# Patient Record
Sex: MALE | Race: BLACK OR AFRICAN AMERICAN | NOT HISPANIC OR LATINO | Employment: OTHER | URBAN - METROPOLITAN AREA
[De-identification: names, ages, dates, MRNs, and addresses within clinical notes are randomized per-mention and may not be internally consistent; named-entity substitution may affect disease eponyms.]

---

## 2017-09-07 ENCOUNTER — HOSPITAL ENCOUNTER (INPATIENT)
Facility: MEDICAL CENTER | Age: 67
LOS: 8 days | DRG: 871 | End: 2017-09-15
Attending: EMERGENCY MEDICINE | Admitting: INTERNAL MEDICINE
Payer: MEDICARE

## 2017-09-07 ENCOUNTER — RESOLUTE PROFESSIONAL BILLING HOSPITAL PROF FEE (OUTPATIENT)
Dept: HOSPITALIST | Facility: MEDICAL CENTER | Age: 67
End: 2017-09-07
Payer: MEDICARE

## 2017-09-07 ENCOUNTER — APPOINTMENT (OUTPATIENT)
Dept: RADIOLOGY | Facility: MEDICAL CENTER | Age: 67
DRG: 871 | End: 2017-09-07
Attending: EMERGENCY MEDICINE
Payer: MEDICARE

## 2017-09-07 DIAGNOSIS — J18.9 PNEUMONIA OF RIGHT LOWER LOBE DUE TO INFECTIOUS ORGANISM: ICD-10-CM

## 2017-09-07 DIAGNOSIS — A41.9 SEPSIS, DUE TO UNSPECIFIED ORGANISM: ICD-10-CM

## 2017-09-07 PROBLEM — E87.6 HYPOKALEMIA: Status: ACTIVE | Noted: 2017-09-07

## 2017-09-07 PROBLEM — K76.9 LIVER DISEASE: Status: ACTIVE | Noted: 2017-09-07

## 2017-09-07 PROBLEM — R74.01 TRANSAMINITIS: Status: ACTIVE | Noted: 2017-09-07

## 2017-09-07 PROBLEM — R50.9 FEVER: Status: ACTIVE | Noted: 2017-09-07

## 2017-09-07 PROBLEM — D72.829 LEUKOCYTOSIS: Status: ACTIVE | Noted: 2017-09-07

## 2017-09-07 PROBLEM — E87.20 LACTIC ACIDOSIS: Status: ACTIVE | Noted: 2017-09-07

## 2017-09-07 PROBLEM — F17.200 SMOKING: Status: ACTIVE | Noted: 2017-09-07

## 2017-09-07 LAB
ALBUMIN SERPL BCP-MCNC: 3.7 G/DL (ref 3.2–4.9)
ALBUMIN/GLOB SERPL: 1.1 G/DL
ALP SERPL-CCNC: 65 U/L (ref 30–99)
ALT SERPL-CCNC: 27 U/L (ref 2–50)
ANION GAP SERPL CALC-SCNC: 12 MMOL/L (ref 0–11.9)
APPEARANCE UR: CLEAR
AST SERPL-CCNC: 53 U/L (ref 12–45)
BASOPHILS # BLD AUTO: 0.4 % (ref 0–1.8)
BASOPHILS # BLD: 0.07 K/UL (ref 0–0.12)
BILIRUB SERPL-MCNC: 0.8 MG/DL (ref 0.1–1.5)
BILIRUB UR QL STRIP.AUTO: NEGATIVE
BUN SERPL-MCNC: 6 MG/DL (ref 8–22)
CALCIUM SERPL-MCNC: 8.7 MG/DL (ref 8.5–10.5)
CHLORIDE SERPL-SCNC: 109 MMOL/L (ref 96–112)
CO2 SERPL-SCNC: 18 MMOL/L (ref 20–33)
COLOR UR: YELLOW
CREAT SERPL-MCNC: 0.76 MG/DL (ref 0.5–1.4)
EOSINOPHIL # BLD AUTO: 0.01 K/UL (ref 0–0.51)
EOSINOPHIL NFR BLD: 0.1 % (ref 0–6.9)
ERYTHROCYTE [DISTWIDTH] IN BLOOD BY AUTOMATED COUNT: 43.8 FL (ref 35.9–50)
GFR SERPL CREATININE-BSD FRML MDRD: >60 ML/MIN/1.73 M 2
GLOBULIN SER CALC-MCNC: 3.3 G/DL (ref 1.9–3.5)
GLUCOSE SERPL-MCNC: 98 MG/DL (ref 65–99)
GLUCOSE UR STRIP.AUTO-MCNC: NEGATIVE MG/DL
HCT VFR BLD AUTO: 43.2 % (ref 42–52)
HGB BLD-MCNC: 14.4 G/DL (ref 14–18)
IMM GRANULOCYTES # BLD AUTO: 0.15 K/UL (ref 0–0.11)
IMM GRANULOCYTES NFR BLD AUTO: 0.8 % (ref 0–0.9)
KETONES UR STRIP.AUTO-MCNC: ABNORMAL MG/DL
LACTATE BLD-SCNC: 2.4 MMOL/L (ref 0.5–2)
LACTATE BLD-SCNC: 3.3 MMOL/L (ref 0.5–2)
LACTATE BLD-SCNC: 3.4 MMOL/L (ref 0.5–2)
LEUKOCYTE ESTERASE UR QL STRIP.AUTO: NEGATIVE
LYMPHOCYTES # BLD AUTO: 0.84 K/UL (ref 1–4.8)
LYMPHOCYTES NFR BLD: 4.6 % (ref 22–41)
MAGNESIUM SERPL-MCNC: 1.3 MG/DL (ref 1.5–2.5)
MCH RBC QN AUTO: 30.5 PG (ref 27–33)
MCHC RBC AUTO-ENTMCNC: 33.3 G/DL (ref 33.7–35.3)
MCV RBC AUTO: 91.5 FL (ref 81.4–97.8)
MICRO URNS: ABNORMAL
MONOCYTES # BLD AUTO: 1.67 K/UL (ref 0–0.85)
MONOCYTES NFR BLD AUTO: 9 % (ref 0–13.4)
NEUTROPHILS # BLD AUTO: 15.72 K/UL (ref 1.82–7.42)
NEUTROPHILS NFR BLD: 85.1 % (ref 44–72)
NITRITE UR QL STRIP.AUTO: NEGATIVE
NRBC # BLD AUTO: 0 K/UL
NRBC BLD AUTO-RTO: 0 /100 WBC
PH UR STRIP.AUTO: 6.5 [PH]
PLATELET # BLD AUTO: 139 K/UL (ref 164–446)
PMV BLD AUTO: 11.9 FL (ref 9–12.9)
POTASSIUM SERPL-SCNC: 3.4 MMOL/L (ref 3.6–5.5)
PROCALCITONIN SERPL-MCNC: 4.04 NG/ML
PROT SERPL-MCNC: 7 G/DL (ref 6–8.2)
PROT UR QL STRIP: NEGATIVE MG/DL
RBC # BLD AUTO: 4.72 M/UL (ref 4.7–6.1)
RBC UR QL AUTO: NEGATIVE
SODIUM SERPL-SCNC: 139 MMOL/L (ref 135–145)
SP GR UR STRIP.AUTO: 1.02
UROBILINOGEN UR STRIP.AUTO-MCNC: 2 MG/DL
WBC # BLD AUTO: 18.5 K/UL (ref 4.8–10.8)

## 2017-09-07 PROCEDURE — 99223 1ST HOSP IP/OBS HIGH 75: CPT | Mod: AI,25 | Performed by: INTERNAL MEDICINE

## 2017-09-07 PROCEDURE — 81003 URINALYSIS AUTO W/O SCOPE: CPT

## 2017-09-07 PROCEDURE — 96365 THER/PROPH/DIAG IV INF INIT: CPT

## 2017-09-07 PROCEDURE — 36415 COLL VENOUS BLD VENIPUNCTURE: CPT

## 2017-09-07 PROCEDURE — 71010 DX-CHEST-PORTABLE (1 VIEW): CPT

## 2017-09-07 PROCEDURE — A9270 NON-COVERED ITEM OR SERVICE: HCPCS | Performed by: HOSPITALIST

## 2017-09-07 PROCEDURE — 96367 TX/PROPH/DG ADDL SEQ IV INF: CPT

## 2017-09-07 PROCEDURE — 99285 EMERGENCY DEPT VISIT HI MDM: CPT

## 2017-09-07 PROCEDURE — 85025 COMPLETE CBC W/AUTO DIFF WBC: CPT

## 2017-09-07 PROCEDURE — A9270 NON-COVERED ITEM OR SERVICE: HCPCS | Performed by: EMERGENCY MEDICINE

## 2017-09-07 PROCEDURE — 87040 BLOOD CULTURE FOR BACTERIA: CPT | Mod: 91

## 2017-09-07 PROCEDURE — 94760 N-INVAS EAR/PLS OXIMETRY 1: CPT

## 2017-09-07 PROCEDURE — 99407 BEHAV CHNG SMOKING > 10 MIN: CPT | Performed by: INTERNAL MEDICINE

## 2017-09-07 PROCEDURE — 87086 URINE CULTURE/COLONY COUNT: CPT

## 2017-09-07 PROCEDURE — 700102 HCHG RX REV CODE 250 W/ 637 OVERRIDE(OP): Performed by: EMERGENCY MEDICINE

## 2017-09-07 PROCEDURE — 700102 HCHG RX REV CODE 250 W/ 637 OVERRIDE(OP): Performed by: HOSPITALIST

## 2017-09-07 PROCEDURE — 700111 HCHG RX REV CODE 636 W/ 250 OVERRIDE (IP): Performed by: INTERNAL MEDICINE

## 2017-09-07 PROCEDURE — 83735 ASSAY OF MAGNESIUM: CPT

## 2017-09-07 PROCEDURE — 700105 HCHG RX REV CODE 258: Performed by: INTERNAL MEDICINE

## 2017-09-07 PROCEDURE — 700105 HCHG RX REV CODE 258: Performed by: EMERGENCY MEDICINE

## 2017-09-07 PROCEDURE — 700102 HCHG RX REV CODE 250 W/ 637 OVERRIDE(OP): Performed by: INTERNAL MEDICINE

## 2017-09-07 PROCEDURE — 84145 PROCALCITONIN (PCT): CPT

## 2017-09-07 PROCEDURE — 700111 HCHG RX REV CODE 636 W/ 250 OVERRIDE (IP): Performed by: EMERGENCY MEDICINE

## 2017-09-07 PROCEDURE — 83605 ASSAY OF LACTIC ACID: CPT

## 2017-09-07 PROCEDURE — 770020 HCHG ROOM/CARE - TELE (206)

## 2017-09-07 PROCEDURE — 80053 COMPREHEN METABOLIC PANEL: CPT

## 2017-09-07 PROCEDURE — A9270 NON-COVERED ITEM OR SERVICE: HCPCS | Performed by: INTERNAL MEDICINE

## 2017-09-07 RX ORDER — ONDANSETRON 4 MG/1
4 TABLET, ORALLY DISINTEGRATING ORAL EVERY 4 HOURS PRN
Status: DISCONTINUED | OUTPATIENT
Start: 2017-09-07 | End: 2017-09-15 | Stop reason: HOSPADM

## 2017-09-07 RX ORDER — BISACODYL 10 MG
10 SUPPOSITORY, RECTAL RECTAL
Status: DISCONTINUED | OUTPATIENT
Start: 2017-09-07 | End: 2017-09-15 | Stop reason: HOSPADM

## 2017-09-07 RX ORDER — FOLIC ACID 1 MG/1
1 TABLET ORAL DAILY
Status: COMPLETED | OUTPATIENT
Start: 2017-09-07 | End: 2017-09-10

## 2017-09-07 RX ORDER — LORAZEPAM 0.5 MG/1
0.5 TABLET ORAL EVERY 4 HOURS PRN
Status: DISCONTINUED | OUTPATIENT
Start: 2017-09-07 | End: 2017-09-15 | Stop reason: HOSPADM

## 2017-09-07 RX ORDER — ACETAMINOPHEN 325 MG/1
650 TABLET ORAL ONCE
Status: COMPLETED | OUTPATIENT
Start: 2017-09-07 | End: 2017-09-07

## 2017-09-07 RX ORDER — LORAZEPAM 2 MG/ML
2 INJECTION INTRAMUSCULAR
Status: DISCONTINUED | OUTPATIENT
Start: 2017-09-07 | End: 2017-09-15 | Stop reason: HOSPADM

## 2017-09-07 RX ORDER — AMOXICILLIN 250 MG
2 CAPSULE ORAL 2 TIMES DAILY
Status: DISCONTINUED | OUTPATIENT
Start: 2017-09-07 | End: 2017-09-15 | Stop reason: HOSPADM

## 2017-09-07 RX ORDER — OXYCODONE HYDROCHLORIDE 5 MG/1
2.5 TABLET ORAL
Status: DISCONTINUED | OUTPATIENT
Start: 2017-09-07 | End: 2017-09-15 | Stop reason: HOSPADM

## 2017-09-07 RX ORDER — NICOTINE 21 MG/24HR
14 PATCH, TRANSDERMAL 24 HOURS TRANSDERMAL
Status: DISCONTINUED | OUTPATIENT
Start: 2017-09-07 | End: 2017-09-15 | Stop reason: HOSPADM

## 2017-09-07 RX ORDER — LORAZEPAM 1 MG/1
2 TABLET ORAL
Status: DISCONTINUED | OUTPATIENT
Start: 2017-09-07 | End: 2017-09-15 | Stop reason: HOSPADM

## 2017-09-07 RX ORDER — MORPHINE SULFATE 4 MG/ML
2 INJECTION, SOLUTION INTRAMUSCULAR; INTRAVENOUS
Status: DISCONTINUED | OUTPATIENT
Start: 2017-09-07 | End: 2017-09-15 | Stop reason: HOSPADM

## 2017-09-07 RX ORDER — SODIUM CHLORIDE 9 MG/ML
500 INJECTION, SOLUTION INTRAVENOUS
Status: DISCONTINUED | OUTPATIENT
Start: 2017-09-07 | End: 2017-09-13

## 2017-09-07 RX ORDER — LORAZEPAM 2 MG/ML
1.5 INJECTION INTRAMUSCULAR
Status: DISCONTINUED | OUTPATIENT
Start: 2017-09-07 | End: 2017-09-15 | Stop reason: HOSPADM

## 2017-09-07 RX ORDER — LORAZEPAM 2 MG/ML
0.5 INJECTION INTRAMUSCULAR EVERY 4 HOURS PRN
Status: DISCONTINUED | OUTPATIENT
Start: 2017-09-07 | End: 2017-09-15 | Stop reason: HOSPADM

## 2017-09-07 RX ORDER — LORAZEPAM 1 MG/1
3 TABLET ORAL
Status: DISCONTINUED | OUTPATIENT
Start: 2017-09-07 | End: 2017-09-15 | Stop reason: HOSPADM

## 2017-09-07 RX ORDER — POLYETHYLENE GLYCOL 3350 17 G/17G
1 POWDER, FOR SOLUTION ORAL
Status: DISCONTINUED | OUTPATIENT
Start: 2017-09-07 | End: 2017-09-15 | Stop reason: HOSPADM

## 2017-09-07 RX ORDER — ACETAMINOPHEN 325 MG/1
650 TABLET ORAL EVERY 6 HOURS PRN
Status: DISCONTINUED | OUTPATIENT
Start: 2017-09-07 | End: 2017-09-15 | Stop reason: HOSPADM

## 2017-09-07 RX ORDER — OXYCODONE HYDROCHLORIDE 5 MG/1
5 TABLET ORAL
Status: DISCONTINUED | OUTPATIENT
Start: 2017-09-07 | End: 2017-09-15 | Stop reason: HOSPADM

## 2017-09-07 RX ORDER — THIAMINE MONONITRATE (VIT B1) 100 MG
100 TABLET ORAL DAILY
Status: COMPLETED | OUTPATIENT
Start: 2017-09-07 | End: 2017-09-10

## 2017-09-07 RX ORDER — AMPICILLIN AND SULBACTAM 2; 1 G/1; G/1
3 INJECTION, POWDER, FOR SOLUTION INTRAMUSCULAR; INTRAVENOUS ONCE
Status: COMPLETED | OUTPATIENT
Start: 2017-09-07 | End: 2017-09-07

## 2017-09-07 RX ORDER — SODIUM CHLORIDE 9 MG/ML
30 INJECTION, SOLUTION INTRAVENOUS
Status: COMPLETED | OUTPATIENT
Start: 2017-09-07 | End: 2017-09-07

## 2017-09-07 RX ORDER — LORAZEPAM 2 MG/ML
1 INJECTION INTRAMUSCULAR
Status: DISCONTINUED | OUTPATIENT
Start: 2017-09-07 | End: 2017-09-15 | Stop reason: HOSPADM

## 2017-09-07 RX ORDER — LORAZEPAM 1 MG/1
4 TABLET ORAL
Status: DISCONTINUED | OUTPATIENT
Start: 2017-09-07 | End: 2017-09-15 | Stop reason: HOSPADM

## 2017-09-07 RX ORDER — LORAZEPAM 1 MG/1
1 TABLET ORAL EVERY 4 HOURS PRN
Status: DISCONTINUED | OUTPATIENT
Start: 2017-09-07 | End: 2017-09-15 | Stop reason: HOSPADM

## 2017-09-07 RX ORDER — AZITHROMYCIN 250 MG/1
250 TABLET, FILM COATED ORAL DAILY
Status: COMPLETED | OUTPATIENT
Start: 2017-09-08 | End: 2017-09-11

## 2017-09-07 RX ORDER — SODIUM CHLORIDE 9 MG/ML
30 INJECTION, SOLUTION INTRAVENOUS
Status: DISCONTINUED | OUTPATIENT
Start: 2017-09-07 | End: 2017-09-07

## 2017-09-07 RX ORDER — AZITHROMYCIN 500 MG/1
500 INJECTION, POWDER, LYOPHILIZED, FOR SOLUTION INTRAVENOUS ONCE
Status: COMPLETED | OUTPATIENT
Start: 2017-09-07 | End: 2017-09-07

## 2017-09-07 RX ORDER — ONDANSETRON 2 MG/ML
4 INJECTION INTRAMUSCULAR; INTRAVENOUS EVERY 4 HOURS PRN
Status: DISCONTINUED | OUTPATIENT
Start: 2017-09-07 | End: 2017-09-15 | Stop reason: HOSPADM

## 2017-09-07 RX ORDER — SODIUM CHLORIDE 9 MG/ML
INJECTION, SOLUTION INTRAVENOUS CONTINUOUS
Status: DISCONTINUED | OUTPATIENT
Start: 2017-09-07 | End: 2017-09-15 | Stop reason: HOSPADM

## 2017-09-07 RX ORDER — POTASSIUM CHLORIDE 20 MEQ/1
40 TABLET, EXTENDED RELEASE ORAL ONCE
Status: COMPLETED | OUTPATIENT
Start: 2017-09-07 | End: 2017-09-07

## 2017-09-07 RX ADMIN — POTASSIUM CHLORIDE 40 MEQ: 1500 TABLET, EXTENDED RELEASE ORAL at 07:00

## 2017-09-07 RX ADMIN — SODIUM CHLORIDE: 9 INJECTION, SOLUTION INTRAVENOUS at 08:47

## 2017-09-07 RX ADMIN — SODIUM CHLORIDE 2313 ML: 9 INJECTION, SOLUTION INTRAVENOUS at 06:07

## 2017-09-07 RX ADMIN — THERA TABS 1 TABLET: TAB at 12:47

## 2017-09-07 RX ADMIN — AMPICILLIN SODIUM AND SULBACTAM SODIUM 3 G: 2; 1 INJECTION, POWDER, FOR SOLUTION INTRAMUSCULAR; INTRAVENOUS at 06:08

## 2017-09-07 RX ADMIN — AMPICILLIN SODIUM AND SULBACTAM SODIUM 3 G: 2; 1 INJECTION, POWDER, FOR SOLUTION INTRAMUSCULAR; INTRAVENOUS at 20:11

## 2017-09-07 RX ADMIN — AMPICILLIN SODIUM AND SULBACTAM SODIUM 3 G: 2; 1 INJECTION, POWDER, FOR SOLUTION INTRAMUSCULAR; INTRAVENOUS at 12:47

## 2017-09-07 RX ADMIN — NICOTINE 14 MG: 14 PATCH, EXTENDED RELEASE TRANSDERMAL at 08:46

## 2017-09-07 RX ADMIN — FOLIC ACID 1 MG: 1 TABLET ORAL at 12:47

## 2017-09-07 RX ADMIN — ACETAMINOPHEN 650 MG: 325 TABLET, FILM COATED ORAL at 06:08

## 2017-09-07 RX ADMIN — SODIUM CHLORIDE: 9 INJECTION, SOLUTION INTRAVENOUS at 20:45

## 2017-09-07 RX ADMIN — LORAZEPAM 1 MG: 1 TABLET ORAL at 15:40

## 2017-09-07 RX ADMIN — Medication 100 MG: at 12:47

## 2017-09-07 RX ADMIN — AZITHROMYCIN MONOHYDRATE 500 MG: 500 INJECTION, POWDER, LYOPHILIZED, FOR SOLUTION INTRAVENOUS at 06:38

## 2017-09-07 ASSESSMENT — LIFESTYLE VARIABLES
TREMOR: *
DOES PATIENT WANT TO TALK TO SOMEONE ABOUT QUITTING: YES
TOTAL SCORE: 3
TOTAL SCORE: 2
EVER FELT BAD OR GUILTY ABOUT YOUR DRINKING: YES
PAROXYSMAL SWEATS: NO SWEAT VISIBLE
CONSUMPTION TOTAL: POSITIVE
ORIENTATION AND CLOUDING OF SENSORIUM: ORIENTED AND CAN DO SERIAL ADDITIONS
VISUAL DISTURBANCES: NOT PRESENT
AUDITORY DISTURBANCES: NOT PRESENT
ORIENTATION AND CLOUDING OF SENSORIUM: ORIENTED AND CAN DO SERIAL ADDITIONS
AUDITORY DISTURBANCES: NOT PRESENT
TOTAL SCORE: 4
ALCOHOL_USE: YES
VISUAL DISTURBANCES: NOT PRESENT
TOTAL SCORE: 4
AGITATION: SOMEWHAT MORE THAN NORMAL ACTIVITY
AUDITORY DISTURBANCES: NOT PRESENT
TREMOR: TREMOR NOT VISIBLE BUT CAN BE FELT, FINGERTIP TO FINGERTIP
HEADACHE, FULLNESS IN HEAD: NOT PRESENT
PAROXYSMAL SWEATS: BARELY PERCEPTIBLE SWEATING. PALMS MOIST
AGITATION: NORMAL ACTIVITY
HAVE PEOPLE ANNOYED YOU BY CRITICIZING YOUR DRINKING: YES
NAUSEA AND VOMITING: NO NAUSEA AND NO VOMITING
ANXIETY: NO ANXIETY (AT EASE)
AGITATION: SOMEWHAT MORE THAN NORMAL ACTIVITY
NAUSEA AND VOMITING: NO NAUSEA AND NO VOMITING
NAUSEA AND VOMITING: NO NAUSEA AND NO VOMITING
VISUAL DISTURBANCES: NOT PRESENT
HAVE YOU EVER FELT YOU SHOULD CUT DOWN ON YOUR DRINKING: YES
EVER HAD A DRINK FIRST THING IN THE MORNING TO STEADY YOUR NERVES TO GET RID OF A HANGOVER: YES
HEADACHE, FULLNESS IN HEAD: NOT PRESENT
TREMOR: *
EVER_SMOKED: YES
TREMOR: NO TREMOR
ANXIETY: MILDLY ANXIOUS
HEADACHE, FULLNESS IN HEAD: NOT PRESENT
ORIENTATION AND CLOUDING OF SENSORIUM: ORIENTED AND CAN DO SERIAL ADDITIONS
HEADACHE, FULLNESS IN HEAD: VERY MILD
TOTAL SCORE: 7
ON A TYPICAL DAY WHEN YOU DRINK ALCOHOL HOW MANY DRINKS DO YOU HAVE: 14
HOW MANY TIMES IN THE PAST YEAR HAVE YOU HAD 5 OR MORE DRINKS IN A DAY: 180
AUDITORY DISTURBANCES: NOT PRESENT
PAROXYSMAL SWEATS: NO SWEAT VISIBLE
EVER_SMOKED: YES
AVERAGE NUMBER OF DAYS PER WEEK YOU HAVE A DRINK CONTAINING ALCOHOL: 7
ANXIETY: *
ORIENTATION AND CLOUDING OF SENSORIUM: ORIENTED AND CAN DO SERIAL ADDITIONS
ANXIETY: MILDLY ANXIOUS
AGITATION: *
NAUSEA AND VOMITING: MILD NAUSEA WITH NO VOMITING
PAROXYSMAL SWEATS: NO SWEAT VISIBLE
DOES PATIENT WANT TO STOP DRINKING: YES
TOTAL SCORE: 4
VISUAL DISTURBANCES: NOT PRESENT
TOTAL SCORE: 4

## 2017-09-07 ASSESSMENT — PATIENT HEALTH QUESTIONNAIRE - PHQ9
5. POOR APPETITE OR OVEREATING: MORE THAN HALF THE DAYS
6. FEELING BAD ABOUT YOURSELF - OR THAT YOU ARE A FAILURE OR HAVE LET YOURSELF OR YOUR FAMILY DOWN: MORE THAN HALF THE DAYS
8. MOVING OR SPEAKING SO SLOWLY THAT OTHER PEOPLE COULD HAVE NOTICED. OR THE OPPOSITE, BEING SO FIGETY OR RESTLESS THAT YOU HAVE BEEN MOVING AROUND A LOT MORE THAN USUAL: MORE THAN HALF THE DAYS
9. THOUGHTS THAT YOU WOULD BE BETTER OFF DEAD, OR OF HURTING YOURSELF: NOT AT ALL
2. FEELING DOWN, DEPRESSED, IRRITABLE, OR HOPELESS: MORE THAN HALF THE DAYS
SUM OF ALL RESPONSES TO PHQ9 QUESTIONS 1 AND 2: 2
4. FEELING TIRED OR HAVING LITTLE ENERGY: MORE THAN HALF THE DAYS
SUM OF ALL RESPONSES TO PHQ QUESTIONS 1-9: 14
3. TROUBLE FALLING OR STAYING ASLEEP OR SLEEPING TOO MUCH: MORE THAN HALF THE DAYS
7. TROUBLE CONCENTRATING ON THINGS, SUCH AS READING THE NEWSPAPER OR WATCHING TELEVISION: MORE THAN HALF THE DAYS

## 2017-09-07 ASSESSMENT — COPD QUESTIONNAIRES
DURING THE PAST 4 WEEKS HOW MUCH DID YOU FEEL SHORT OF BREATH: NONE/LITTLE OF THE TIME
COPD SCREENING SCORE: 5
DO YOU EVER COUGH UP ANY MUCUS OR PHLEGM?: NO/ONLY WITH OCCASIONAL COLDS OR INFECTIONS
HAVE YOU SMOKED AT LEAST 100 CIGARETTES IN YOUR ENTIRE LIFE: YES

## 2017-09-07 ASSESSMENT — PAIN SCALES - GENERAL
PAINLEVEL_OUTOF10: 0
PAINLEVEL_OUTOF10: 0

## 2017-09-07 NOTE — H&P
HOSPITAL MEDICINE HISTORY/ PHYSICAL    Date & Time note created:    9/7/2017   6:09 AM       Date & Time Patient was seen:   9/7/2017    Patient ID:   Name: Chepe Persaud. YOB: 1950. Age: 66 y.o. male. MRN: [id number]    Admitting Attending:  Priscila Benavidez     PCP : Pcp Pt States None        Chief Complaint:       Fever, cough, fatigue, alcohol intoxication    History of Present Illness:    Cori is a 66 y.o. male w/h/o alcoholic, liver disease, who presents with complaint of fever, cough, fatigue, and alcohol intoxication. Patient said he drink alcohol at home but did not have any. Patient also complained of fever, cough, fatigue for the possibility days. Patient has chronic abdominal pain and taking Percocet. In the ER patient was noticed to have fever, leukocytosis, chest x-ray confirmed patient has pneumonia likely. Patient elected acid is 3.2. Patient was then recommended to be admitted to the hospital for evaluation and treatment. Patient otherwise denies chills, nausea, vomiting, adb pain, SOB, CP, headache, constipation, diarrhea, or sputum.    Review of Systems:      per HPI otherwise 14 points reviewed 12 systems neg per AMA/CMS criteria.        Past Medical/ Family / Social history (PFSH):   Past Medical History:   Diagnosis Date   • Liver disease      Past Surgical History:   Procedure Laterality Date   • PATELLA ORIF  1/17/2009    Performed by COMPA MURILLO at [facility]     Current Outpatient Medications:  No current facility-administered medications on file prior to encounter.      Current Outpatient Prescriptions on File Prior to Encounter   Medication Sig Dispense Refill   • PERCOCET PO Take  by mouth.       Medication Allergy/Sensitivities:  Allergies   Allergen Reactions   • Codeine Hives     Family History:  History reviewed. No pertinent family history.   reviewed and felt non pertinent to this encounter     Social History:  Social History   Substance Use  Topics   • Smoking status: Current Every Day Smoker     Packs/day: 0.50     Types: Cigarettes   • Smokeless tobacco: Never Used      Comment: pk/day   • Alcohol use Yes      Comment: daily, gallon of vodka per day     #################################################################  Physical Exam:   Vitals/ General Appearance:   Weight/BMI: Body mass index is 23.06 kg/m².  Pulse 94, temperature (!) 38.6 °C (101.5 °F), resp. rate 18, height 1.829 m (6'), weight 77.1 kg (170 lb), SpO2 96 %.   Vitals:    09/07/17 0424 09/07/17 0430 09/07/17 0500 09/07/17 0530   Pulse: 97 88 92 94   Resp: (!) 24 (!) 21 18 18   Temp: (!) 38.6 °C (101.5 °F)      SpO2: 93% 94% 95% 96%   Weight:       Height:        Oxygen Therapy:  Pulse Oximetry: 96 %, O2 (LPM): 0, O2 Delivery: None (Room Air)    Constitutional:  thin, frail  HENMT: Normocephalic, atraumatic, b/l ears normal, nose normal  Eyes:  EOMI, conjunctiva normal, no discharge  Neck: no tracheal deviation, supple  Cardiovascular: tachycardic, normal rhythm, no murmurs, no rubs or gallops; no cyanosis, clubbing or edema  Lungs: Respiratory effort is normal, normal breath sounds, breath sounds clear to auscultation b/l, no rales, rhonchi or wheezing  Abdomen: soft, non-tender, no guarding or rebound, active BS, no mass  Skin: warm, dry, no erythema, no rash  Neurologic: Alert and oriented, strength 5/5, no focal deficits, CN II-XII normal  Psychiatric: No anxiety or depression  Lymph node: No lymphadenopathy appreciated in the neck groin and axillary area.   Extremities: Bilateral lower extremities no pitting edema, bilateral pulses symmetric  #################################################################  Lab Data Review:    Objective   Recent Results (from the past 24 hour(s))   LACTIC ACID    Collection Time: 09/07/17  4:47 AM   Result Value Ref Range    Lactic Acid 3.4 (H) 0.5 - 2.0 mmol/L   CBC WITH DIFFERENTIAL    Collection Time: 09/07/17  5:08 AM   Result Value Ref Range     WBC 18.5 (H) 4.8 - 10.8 K/uL    RBC 4.72 4.70 - 6.10 M/uL    Hemoglobin 14.4 14.0 - 18.0 g/dL    Hematocrit 43.2 42.0 - 52.0 %    MCV 91.5 81.4 - 97.8 fL    MCH 30.5 27.0 - 33.0 pg    MCHC 33.3 (L) 33.7 - 35.3 g/dL    RDW 43.8 35.9 - 50.0 fL    Platelet Count 139 (L) 164 - 446 K/uL    MPV 11.9 9.0 - 12.9 fL    Neutrophils-Polys 85.10 (H) 44.00 - 72.00 %    Lymphocytes 4.60 (L) 22.00 - 41.00 %    Monocytes 9.00 0.00 - 13.40 %    Eosinophils 0.10 0.00 - 6.90 %    Basophils 0.40 0.00 - 1.80 %    Immature Granulocytes 0.80 0.00 - 0.90 %    Nucleated RBC 0.00 /100 WBC    Neutrophils (Absolute) 15.72 (H) 1.82 - 7.42 K/uL    Lymphs (Absolute) 0.84 (L) 1.00 - 4.80 K/uL    Monos (Absolute) 1.67 (H) 0.00 - 0.85 K/uL    Eos (Absolute) 0.01 0.00 - 0.51 K/uL    Baso (Absolute) 0.07 0.00 - 0.12 K/uL    Immature Granulocytes (abs) 0.15 (H) 0.00 - 0.11 K/uL    NRBC (Absolute) 0.00 K/uL   COMP METABOLIC PANEL    Collection Time: 09/07/17  5:08 AM   Result Value Ref Range    Sodium 139 135 - 145 mmol/L    Potassium 3.4 (L) 3.6 - 5.5 mmol/L    Chloride 109 96 - 112 mmol/L    Co2 18 (L) 20 - 33 mmol/L    Anion Gap 12.0 (H) 0.0 - 11.9    Glucose 98 65 - 99 mg/dL    Bun 6 (L) 8 - 22 mg/dL    Creatinine 0.76 0.50 - 1.40 mg/dL    Calcium 8.7 8.5 - 10.5 mg/dL    AST(SGOT) 53 (H) 12 - 45 U/L    ALT(SGPT) 27 2 - 50 U/L    Alkaline Phosphatase 65 30 - 99 U/L    Total Bilirubin 0.8 0.1 - 1.5 mg/dL    Albumin 3.7 3.2 - 4.9 g/dL    Total Protein 7.0 6.0 - 8.2 g/dL    Globulin 3.3 1.9 - 3.5 g/dL    A-G Ratio 1.1 g/dL   ESTIMATED GFR    Collection Time: 09/07/17  5:08 AM   Result Value Ref Range    GFR If African American >60 >60 mL/min/1.73 m 2    GFR If Non African American >60 >60 mL/min/1.73 m 2       (click the triangle to expand results)    Imaging/Procedures Review:    DX-CHEST-PORTABLE (1 VIEW)   Final Result         1. Airspace opacities in the right lower lung, likely pneumonia.        EKG:   per my independant read:  HR: 102, Sinus  Rhythm, no ST/T changes    Assessment and Plan:      Smoking- (present on admission)   Assessment & Plan    Smoking secession education provided  nicotine patch        Lactic acidosis- (present on admission)   Assessment & Plan    Lactic 3.4  From sepsis        Transaminitis- (present on admission)   Assessment & Plan    From etoh        Hypokalemia- (present on admission)   Assessment & Plan    K 3.4  Po replace  Check mg        Leukocytosis- (present on admission)   Assessment & Plan    From sepsis and pna  Recheck in am  Culture pending        Fever- (present on admission)   Assessment & Plan    From PNA  Iv abx        Liver disease- (present on admission)   Assessment & Plan    Likely from ETOH  Mild LFT elevation  Close monitor        PNA (pneumonia)- (present on admission)   Assessment & Plan    XR confirmed PNA  Iv unasyn and azithro  Culture pending  Check procalcitonin        Sepsis (CMS-HCC)- (present on admission)   Assessment & Plan    This is sepsis (without associated acute organ dysfunction).   Lactic 3.4  We will put the patient on full sepsis protocol including lactic acid checks  ivf  Iv abx unasyn and azithro  Culture pending          1. Prophylaxis: lovenox  2. Code: Full code per patient   3. Dispo: he will be admitted to inpatient for management that is expected to take more than 2 midnights   I have discussed patient admission status with  in the ER.    I spent 73 minutes evaluating Mr. Persaud, reviewing the chart, vitals, labs and imaging, discussing the case with ED physician, medication reconciliation, placing orders and enacting the plan above.    Spent 11 minutes on tobacco cessation counseling including nicotine patches, gum, and dangers of smoking.    The pt indicated that will quit.     46928 (smoking and tobacco cessation counseling visit; intensive, greater than 10 minutes).    This dictation was created using voice recognition software. The accuracy of the dictation  is limited to the abilities of the software. Although every efforts have been used to decrease the error, I expect there may be some errors of grammar and possibly content.

## 2017-09-07 NOTE — ED PROVIDER NOTES
ED Provider Note    Scribed for Vidal Banerjee M.D. by Kelley Saini. 9/7/2017  4:52 AM    Primary care provider: Pcp Pt States None  Means of arrival: EMS  History obtained from: Patient   History limited by: None    CHIEF COMPLAINT  Chief Complaint   Patient presents with   • Alcohol Intoxication       HPI  Chepe Persaud is a 66 y.o. male who presents to the Emergency Department for alcohol intoxication, just prior to arrival in the ED. Patient states that he generally has not been feeling well. Patient reports he has had a cough, vomiting, and diarrhea. He denies any fever, shortness of breath, chest pain, abdominal pain, joint pain, back pain, or neck pain associated.     REVIEW OF SYSTEMS  Pertinent positives include substance abuse, cough, vomiting, diarrhea.   Pertinent negatives include no  fever, shortness of breath, chest pain, abdominal pain, joint pain, back pain, or neck pain.    All other systems reviewed and negative.    C.    PAST MEDICAL HISTORY   has a past medical history of Liver disease.    SURGICAL HISTORY   has a past surgical history that includes patella orif (1/17/2009).    SOCIAL HISTORY  Social History   Substance Use Topics   • Smoking status: Current Every Day Smoker     Packs/day: 0.50     Types: Cigarettes   • Smokeless tobacco: Never Used      Comment: pk/day   • Alcohol use Yes      Comment: daily, gallon of vodka per day      History   Drug Use No       FAMILY HISTORY  History reviewed. No pertinent family history.    CURRENT MEDICATIONS  Home Medications     Reviewed by Dinesh Bryan R.N. (Registered Nurse) on 09/07/17 at 0424  Med List Status: Not Addressed   Medication Last Dose Status   PERCOCET PO  Active                ALLERGIES  Allergies   Allergen Reactions   • Codeine Hives       PHYSICAL EXAM  VITAL SIGNS: Pulse 92   Temp (!) 38.6 °C (101.5 °F)   Resp 18   Ht 1.829 m (6')   Wt 77.1 kg (170 lb)   SpO2 95%   BMI 23.06 kg/m²     Nursing note and  vitals reviewed.  Constitutional: Well-developed and well-nourished. Mild distress.  chronically ill-appearing.  HENT: Head is normocephalic and atraumatic. Oropharynx is clear and moist without exudate or erythema.   Eyes: Pupils are equal, round, and reactive to light. Conjunctiva are normal.   Cardiovascular: Normal rate and regular rhythm. No murmur heard. Normal radial pulses.  Pulmonary/Chest: Scattered rhonchi. No wheezes or rales.   Abdominal: Soft and non-tender. No distention    Musculoskeletal: Extremities exhibit normal range of motion without edema or tenderness.   Neurological: Awake, alert and oriented to person, place, and time. No focal deficits noted.  Skin: Skin is warm and dry. No rash. Tactile fever.  Psychiatric: Normal mood and affect. Appropriate for clinical situation    DIAGNOSTIC STUDIES / PROCEDURES    EKG Interpretation  See below.     LABS  Results for orders placed or performed during the hospital encounter of 09/07/17   LACTIC ACID   Result Value Ref Range    Lactic Acid 3.4 (H) 0.5 - 2.0 mmol/L   CBC WITH DIFFERENTIAL   Result Value Ref Range    WBC 18.5 (H) 4.8 - 10.8 K/uL    RBC 4.72 4.70 - 6.10 M/uL    Hemoglobin 14.4 14.0 - 18.0 g/dL    Hematocrit 43.2 42.0 - 52.0 %    MCV 91.5 81.4 - 97.8 fL    MCH 30.5 27.0 - 33.0 pg    MCHC 33.3 (L) 33.7 - 35.3 g/dL    RDW 43.8 35.9 - 50.0 fL    Platelet Count 139 (L) 164 - 446 K/uL    MPV 11.9 9.0 - 12.9 fL    Neutrophils-Polys 85.10 (H) 44.00 - 72.00 %    Lymphocytes 4.60 (L) 22.00 - 41.00 %    Monocytes 9.00 0.00 - 13.40 %    Eosinophils 0.10 0.00 - 6.90 %    Basophils 0.40 0.00 - 1.80 %    Immature Granulocytes 0.80 0.00 - 0.90 %    Nucleated RBC 0.00 /100 WBC    Neutrophils (Absolute) 15.72 (H) 1.82 - 7.42 K/uL    Lymphs (Absolute) 0.84 (L) 1.00 - 4.80 K/uL    Monos (Absolute) 1.67 (H) 0.00 - 0.85 K/uL    Eos (Absolute) 0.01 0.00 - 0.51 K/uL    Baso (Absolute) 0.07 0.00 - 0.12 K/uL    Immature Granulocytes (abs) 0.15 (H) 0.00 - 0.11 K/uL     NRBC (Absolute) 0.00 K/uL   COMP METABOLIC PANEL   Result Value Ref Range    Sodium 139 135 - 145 mmol/L    Potassium 3.4 (L) 3.6 - 5.5 mmol/L    Chloride 109 96 - 112 mmol/L    Co2 18 (L) 20 - 33 mmol/L    Anion Gap 12.0 (H) 0.0 - 11.9    Glucose 98 65 - 99 mg/dL    Bun 6 (L) 8 - 22 mg/dL    Creatinine 0.76 0.50 - 1.40 mg/dL    Calcium 8.7 8.5 - 10.5 mg/dL    AST(SGOT) 53 (H) 12 - 45 U/L    ALT(SGPT) 27 2 - 50 U/L    Alkaline Phosphatase 65 30 - 99 U/L    Total Bilirubin 0.8 0.1 - 1.5 mg/dL    Albumin 3.7 3.2 - 4.9 g/dL    Total Protein 7.0 6.0 - 8.2 g/dL    Globulin 3.3 1.9 - 3.5 g/dL    A-G Ratio 1.1 g/dL   ESTIMATED GFR   Result Value Ref Range    GFR If African American >60 >60 mL/min/1.73 m 2    GFR If Non African American >60 >60 mL/min/1.73 m 2       All labs reviewed by me.    RADIOLOGY  DX-CHEST-PORTABLE (1 VIEW)   Final Result         1. Airspace opacities in the right lower lung, likely pneumonia.        The radiologist's interpretation of all radiological studies have been reviewed by me.    COURSE & MEDICAL DECISION MAKING  Nursing notes, VS, PMSFHx reviewed in chart.       4:52 AM - Patient seen and examined at bedside. Ordered lactic acid, estimated GFR, CBC with differential, CMP, Urinalysis, Urine culture, blood culture, DX-chest to evaluate his symptoms. The differential diagnoses include but are not limited to:Pneumonia, urinary tract infection, I like slight abnormality, sepsis    Laboratory studies are remarkable for an elevated white blood cell count. Patient's overall presentation is consistent with sepsis. Chest x-ray is consistent with pneumonia. The patient has been treated with antibiotics appropriate for pneumonia as well as normal saline fluid resuscitation consistent with sepsis recommendations.    5:59 AM Callahan texted Dr. Benavidez about the patient's case.     6:08AM Patient treated with 650mg Tylenol, 3g Unasyn, 400mg Zithromax, and IV fluids for sepsis.     6:28 AM - I discussed the  patient's case and the above findings with Dr. Benavidez (Hospitalist) who agrees to admit the patient.    6:30 AM - Patient treated with 40mEq potassium chloride.    CRITICAL CARE  I provided critical care services, which included medication orders, frequent reevaluations of the patient's condition and response to treatment, ordering and reviewing test results, and discussing the case with various consultants.  The critical care time associated with the care of the patient was 35 minutes. Review chart for interventions. This time is exclusive of any other billable procedures.        DISPOSITION:  Patient will be admitted to Dr. Benavidez (Hospitalist) in critical condition.      FINAL IMPRESSION  1. Sepsis, due to unspecified organism (CMS-HCC)    2. Pneumonia of right lower lobe due to infectious organism          IKelley (Scribe), am scribing for, and in the presence of, Vidal Banerjee M.D..    Electronically signed by: Kelley Saini (Scribe), 9/7/2017    IVidal M.D. personally performed the services described in this documentation, as scribed by Kelley Saini in my presence, and it is both accurate and complete.    The note accurately reflects work and decisions made by me.  Vidal Banerjee  9/7/2017  11:29 AM

## 2017-09-07 NOTE — ED NOTES
Pt BIB EMS. Pt was found outside shivering on the street. Pt was in wet clothes. Pt was incontinent of urine and stool. Pt admits to drink beer and vodka today. Pt denies drug use. Pin point pupils. Pt is A&Ox4 and cooperative at this time.

## 2017-09-07 NOTE — ASSESSMENT & PLAN NOTE
This is sepsis (without associated acute organ dysfunction). - pneumonia source-  Resolved- afeb, nl wbc  Follow clinically   oral atbs.

## 2017-09-07 NOTE — ED NOTES
Med rec complete per Pt at bedside  Pt states he takes Tramadol and Naproxen  But has not had either medication in over a month  Allergies reviewed  No ABX in last 30 days

## 2017-09-07 NOTE — ASSESSMENT & PLAN NOTE
CAP -- Improved, off o2-- completed Azith - Clinically better  oral augmentin- complete 10 day course.

## 2017-09-08 PROBLEM — F10.939 ALCOHOL WITHDRAWAL (HCC): Status: ACTIVE | Noted: 2017-09-08

## 2017-09-08 LAB
ALBUMIN SERPL BCP-MCNC: 2.8 G/DL (ref 3.2–4.9)
ALBUMIN/GLOB SERPL: 1 G/DL
ALP SERPL-CCNC: 50 U/L (ref 30–99)
ALT SERPL-CCNC: 25 U/L (ref 2–50)
ANION GAP SERPL CALC-SCNC: 7 MMOL/L (ref 0–11.9)
AST SERPL-CCNC: 58 U/L (ref 12–45)
BASOPHILS # BLD AUTO: 0.4 % (ref 0–1.8)
BASOPHILS # BLD: 0.07 K/UL (ref 0–0.12)
BILIRUB SERPL-MCNC: 0.8 MG/DL (ref 0.1–1.5)
BUN SERPL-MCNC: 8 MG/DL (ref 8–22)
CALCIUM SERPL-MCNC: 7.9 MG/DL (ref 8.5–10.5)
CHLORIDE SERPL-SCNC: 108 MMOL/L (ref 96–112)
CO2 SERPL-SCNC: 23 MMOL/L (ref 20–33)
CREAT SERPL-MCNC: 0.94 MG/DL (ref 0.5–1.4)
EOSINOPHIL # BLD AUTO: 0.17 K/UL (ref 0–0.51)
EOSINOPHIL NFR BLD: 1 % (ref 0–6.9)
ERYTHROCYTE [DISTWIDTH] IN BLOOD BY AUTOMATED COUNT: 44.7 FL (ref 35.9–50)
GFR SERPL CREATININE-BSD FRML MDRD: >60 ML/MIN/1.73 M 2
GLOBULIN SER CALC-MCNC: 2.8 G/DL (ref 1.9–3.5)
GLUCOSE SERPL-MCNC: 93 MG/DL (ref 65–99)
HCT VFR BLD AUTO: 39.5 % (ref 42–52)
HGB BLD-MCNC: 13 G/DL (ref 14–18)
IMM GRANULOCYTES # BLD AUTO: 0.26 K/UL (ref 0–0.11)
IMM GRANULOCYTES NFR BLD AUTO: 1.6 % (ref 0–0.9)
LACTATE BLD-SCNC: 1.1 MMOL/L (ref 0.5–2)
LYMPHOCYTES # BLD AUTO: 2.68 K/UL (ref 1–4.8)
LYMPHOCYTES NFR BLD: 16.1 % (ref 22–41)
MAGNESIUM SERPL-MCNC: 1.5 MG/DL (ref 1.5–2.5)
MCH RBC QN AUTO: 30.4 PG (ref 27–33)
MCHC RBC AUTO-ENTMCNC: 32.9 G/DL (ref 33.7–35.3)
MCV RBC AUTO: 92.5 FL (ref 81.4–97.8)
MONOCYTES # BLD AUTO: 1.1 K/UL (ref 0–0.85)
MONOCYTES NFR BLD AUTO: 6.6 % (ref 0–13.4)
NEUTROPHILS # BLD AUTO: 12.39 K/UL (ref 1.82–7.42)
NEUTROPHILS NFR BLD: 74.3 % (ref 44–72)
NRBC # BLD AUTO: 0 K/UL
NRBC BLD AUTO-RTO: 0 /100 WBC
PHOSPHATE SERPL-MCNC: 2 MG/DL (ref 2.5–4.5)
PLATELET # BLD AUTO: 122 K/UL (ref 164–446)
PMV BLD AUTO: 12.3 FL (ref 9–12.9)
POTASSIUM SERPL-SCNC: 3.4 MMOL/L (ref 3.6–5.5)
PROT SERPL-MCNC: 5.6 G/DL (ref 6–8.2)
RBC # BLD AUTO: 4.27 M/UL (ref 4.7–6.1)
SODIUM SERPL-SCNC: 138 MMOL/L (ref 135–145)
WBC # BLD AUTO: 16.7 K/UL (ref 4.8–10.8)

## 2017-09-08 PROCEDURE — 85025 COMPLETE CBC W/AUTO DIFF WBC: CPT

## 2017-09-08 PROCEDURE — A9270 NON-COVERED ITEM OR SERVICE: HCPCS | Performed by: INTERNAL MEDICINE

## 2017-09-08 PROCEDURE — 83605 ASSAY OF LACTIC ACID: CPT

## 2017-09-08 PROCEDURE — 700111 HCHG RX REV CODE 636 W/ 250 OVERRIDE (IP): Performed by: INTERNAL MEDICINE

## 2017-09-08 PROCEDURE — 700102 HCHG RX REV CODE 250 W/ 637 OVERRIDE(OP): Performed by: HOSPITALIST

## 2017-09-08 PROCEDURE — A9270 NON-COVERED ITEM OR SERVICE: HCPCS | Performed by: HOSPITALIST

## 2017-09-08 PROCEDURE — 83735 ASSAY OF MAGNESIUM: CPT

## 2017-09-08 PROCEDURE — 99232 SBSQ HOSP IP/OBS MODERATE 35: CPT | Performed by: HOSPITALIST

## 2017-09-08 PROCEDURE — 80053 COMPREHEN METABOLIC PANEL: CPT

## 2017-09-08 PROCEDURE — 36415 COLL VENOUS BLD VENIPUNCTURE: CPT

## 2017-09-08 PROCEDURE — 770020 HCHG ROOM/CARE - TELE (206)

## 2017-09-08 PROCEDURE — 700105 HCHG RX REV CODE 258: Performed by: INTERNAL MEDICINE

## 2017-09-08 PROCEDURE — 700102 HCHG RX REV CODE 250 W/ 637 OVERRIDE(OP): Performed by: INTERNAL MEDICINE

## 2017-09-08 PROCEDURE — 84100 ASSAY OF PHOSPHORUS: CPT

## 2017-09-08 RX ORDER — POTASSIUM CHLORIDE 20 MEQ/1
20 TABLET, EXTENDED RELEASE ORAL DAILY
Status: DISCONTINUED | OUTPATIENT
Start: 2017-09-08 | End: 2017-09-09

## 2017-09-08 RX ADMIN — AMPICILLIN SODIUM AND SULBACTAM SODIUM 3 G: 2; 1 INJECTION, POWDER, FOR SOLUTION INTRAMUSCULAR; INTRAVENOUS at 23:33

## 2017-09-08 RX ADMIN — ENOXAPARIN SODIUM 40 MG: 100 INJECTION SUBCUTANEOUS at 21:48

## 2017-09-08 RX ADMIN — Medication 100 MG: at 09:32

## 2017-09-08 RX ADMIN — SODIUM CHLORIDE: 9 INJECTION, SOLUTION INTRAVENOUS at 22:22

## 2017-09-08 RX ADMIN — POTASSIUM CHLORIDE 20 MEQ: 1500 TABLET, EXTENDED RELEASE ORAL at 18:30

## 2017-09-08 RX ADMIN — THERA TABS 1 TABLET: TAB at 09:32

## 2017-09-08 RX ADMIN — STANDARDIZED SENNA CONCENTRATE AND DOCUSATE SODIUM 2 TABLET: 8.6; 5 TABLET, FILM COATED ORAL at 21:48

## 2017-09-08 RX ADMIN — AMPICILLIN SODIUM AND SULBACTAM SODIUM 3 G: 2; 1 INJECTION, POWDER, FOR SOLUTION INTRAMUSCULAR; INTRAVENOUS at 13:50

## 2017-09-08 RX ADMIN — STANDARDIZED SENNA CONCENTRATE AND DOCUSATE SODIUM 2 TABLET: 8.6; 5 TABLET, FILM COATED ORAL at 09:34

## 2017-09-08 RX ADMIN — LORAZEPAM 1 MG: 1 TABLET ORAL at 09:33

## 2017-09-08 RX ADMIN — AMPICILLIN SODIUM AND SULBACTAM SODIUM 3 G: 2; 1 INJECTION, POWDER, FOR SOLUTION INTRAMUSCULAR; INTRAVENOUS at 05:16

## 2017-09-08 RX ADMIN — AMPICILLIN SODIUM AND SULBACTAM SODIUM 3 G: 2; 1 INJECTION, POWDER, FOR SOLUTION INTRAMUSCULAR; INTRAVENOUS at 00:12

## 2017-09-08 RX ADMIN — AZITHROMYCIN 250 MG: 250 TABLET, FILM COATED ORAL at 09:32

## 2017-09-08 RX ADMIN — NICOTINE 14 MG: 14 PATCH, EXTENDED RELEASE TRANSDERMAL at 05:03

## 2017-09-08 RX ADMIN — AMPICILLIN SODIUM AND SULBACTAM SODIUM 3 G: 2; 1 INJECTION, POWDER, FOR SOLUTION INTRAMUSCULAR; INTRAVENOUS at 18:45

## 2017-09-08 RX ADMIN — FOLIC ACID 1 MG: 1 TABLET ORAL at 09:32

## 2017-09-08 ASSESSMENT — LIFESTYLE VARIABLES
ORIENTATION AND CLOUDING OF SENSORIUM: ORIENTED AND CAN DO SERIAL ADDITIONS
HEADACHE, FULLNESS IN HEAD: VERY MILD
AUDITORY DISTURBANCES: NOT PRESENT
AUDITORY DISTURBANCES: NOT PRESENT
NAUSEA AND VOMITING: MILD NAUSEA WITH NO VOMITING
TOTAL SCORE: 2
ANXIETY: MILDLY ANXIOUS
ORIENTATION AND CLOUDING OF SENSORIUM: ORIENTED AND CAN DO SERIAL ADDITIONS
PAROXYSMAL SWEATS: NO SWEAT VISIBLE
NAUSEA AND VOMITING: NO NAUSEA AND NO VOMITING
TREMOR: TREMOR NOT VISIBLE BUT CAN BE FELT, FINGERTIP TO FINGERTIP
TOTAL SCORE: 7
PAROXYSMAL SWEATS: NO SWEAT VISIBLE
HEADACHE, FULLNESS IN HEAD: NOT PRESENT
ANXIETY: NO ANXIETY (AT EASE)
VISUAL DISTURBANCES: NOT PRESENT
ANXIETY: NO ANXIETY (AT EASE)
TOTAL SCORE: 1
ANXIETY: MILDLY ANXIOUS
AUDITORY DISTURBANCES: NOT PRESENT
HEADACHE, FULLNESS IN HEAD: NOT PRESENT
AGITATION: NORMAL ACTIVITY
VISUAL DISTURBANCES: NOT PRESENT
AUDITORY DISTURBANCES: NOT PRESENT
ORIENTATION AND CLOUDING OF SENSORIUM: ORIENTED AND CAN DO SERIAL ADDITIONS
TOTAL SCORE: 4
ANXIETY: NO ANXIETY (AT EASE)
AGITATION: NORMAL ACTIVITY
PAROXYSMAL SWEATS: NO SWEAT VISIBLE
VISUAL DISTURBANCES: NOT PRESENT
NAUSEA AND VOMITING: NO NAUSEA AND NO VOMITING
ANXIETY: MILDLY ANXIOUS
TREMOR: TREMOR NOT VISIBLE BUT CAN BE FELT, FINGERTIP TO FINGERTIP
PAROXYSMAL SWEATS: NO SWEAT VISIBLE
HEADACHE, FULLNESS IN HEAD: NOT PRESENT
NAUSEA AND VOMITING: NO NAUSEA AND NO VOMITING
TOTAL SCORE: 2
AUDITORY DISTURBANCES: NOT PRESENT
ANXIETY: NO ANXIETY (AT EASE)
HEADACHE, FULLNESS IN HEAD: VERY MILD
HEADACHE, FULLNESS IN HEAD: VERY MILD
TREMOR: TREMOR NOT VISIBLE BUT CAN BE FELT, FINGERTIP TO FINGERTIP
ORIENTATION AND CLOUDING OF SENSORIUM: ORIENTED AND CAN DO SERIAL ADDITIONS
AGITATION: SOMEWHAT MORE THAN NORMAL ACTIVITY
AGITATION: NORMAL ACTIVITY
TREMOR: TREMOR NOT VISIBLE BUT CAN BE FELT, FINGERTIP TO FINGERTIP
TREMOR: NO TREMOR
NAUSEA AND VOMITING: NO NAUSEA AND NO VOMITING
PAROXYSMAL SWEATS: BARELY PERCEPTIBLE SWEATING. PALMS MOIST
TREMOR: TREMOR NOT VISIBLE BUT CAN BE FELT, FINGERTIP TO FINGERTIP
AUDITORY DISTURBANCES: NOT PRESENT
AGITATION: SOMEWHAT MORE THAN NORMAL ACTIVITY
TREMOR: TREMOR NOT VISIBLE BUT CAN BE FELT, FINGERTIP TO FINGERTIP
ORIENTATION AND CLOUDING OF SENSORIUM: ORIENTED AND CAN DO SERIAL ADDITIONS
AGITATION: NORMAL ACTIVITY
AUDITORY DISTURBANCES: NOT PRESENT
PAROXYSMAL SWEATS: NO SWEAT VISIBLE
TOTAL SCORE: 2
PAROXYSMAL SWEATS: BARELY PERCEPTIBLE SWEATING. PALMS MOIST
ORIENTATION AND CLOUDING OF SENSORIUM: ORIENTED AND CAN DO SERIAL ADDITIONS
NAUSEA AND VOMITING: NO NAUSEA AND NO VOMITING
VISUAL DISTURBANCES: NOT PRESENT
VISUAL DISTURBANCES: NOT PRESENT
HEADACHE, FULLNESS IN HEAD: NOT PRESENT
NAUSEA AND VOMITING: NO NAUSEA AND NO VOMITING
VISUAL DISTURBANCES: NOT PRESENT
AGITATION: *
TOTAL SCORE: 1
ORIENTATION AND CLOUDING OF SENSORIUM: ORIENTED AND CAN DO SERIAL ADDITIONS
VISUAL DISTURBANCES: NOT PRESENT

## 2017-09-08 ASSESSMENT — PAIN SCALES - GENERAL
PAINLEVEL_OUTOF10: 0

## 2017-09-08 ASSESSMENT — ENCOUNTER SYMPTOMS
HEARTBURN: 0
PHOTOPHOBIA: 0
FEVER: 0
HEADACHES: 0

## 2017-09-08 NOTE — PROGRESS NOTES
Assumed care of patient.  Patient is calm, resting with eyes closed, and in no acute distress.  CIWA protocol in place.  Bed alarm on.  Bed in lowest position and locked.  Call light in reach at all times.  Will monitor per protocol.

## 2017-09-08 NOTE — CARE PLAN
Problem: Safety  Goal: Will remain free from injury  Outcome: PROGRESSING AS EXPECTED  Call light in reach at all times.  Bed in low/locked position with strip alarm on.  Hourly rounding in place.    Problem: Venous Thromboembolism (VTW)/Deep Vein Thrombosis (DVT) Prevention:  Goal: Patient will participate in Venous Thrombosis (VTE)/Deep Vein Thrombosis (DVT)Prevention Measures  Outcome: PROGRESSING AS EXPECTED  lovenox for anticoagulation

## 2017-09-08 NOTE — CARE PLAN
Problem: Safety  Goal: Will remain free from injury  Outcome: PROGRESSING AS EXPECTED  Patient remained free of falls or injury during shift    Problem: Infection  Goal: Will remain free from infection  Outcome: PROGRESSING AS EXPECTED  Patient remained free of new signs or symptoms of infection during shift.

## 2017-09-09 LAB
BACTERIA UR CULT: NORMAL
BASOPHILS # BLD AUTO: 0.3 % (ref 0–1.8)
BASOPHILS # BLD: 0.03 K/UL (ref 0–0.12)
EKG IMPRESSION: NORMAL
EOSINOPHIL # BLD AUTO: 0.15 K/UL (ref 0–0.51)
EOSINOPHIL NFR BLD: 1.6 % (ref 0–6.9)
ERYTHROCYTE [DISTWIDTH] IN BLOOD BY AUTOMATED COUNT: 44.4 FL (ref 35.9–50)
HCT VFR BLD AUTO: 38.2 % (ref 42–52)
HGB BLD-MCNC: 12.9 G/DL (ref 14–18)
IMM GRANULOCYTES # BLD AUTO: 0.06 K/UL (ref 0–0.11)
IMM GRANULOCYTES NFR BLD AUTO: 0.6 % (ref 0–0.9)
LYMPHOCYTES # BLD AUTO: 1.28 K/UL (ref 1–4.8)
LYMPHOCYTES NFR BLD: 13.3 % (ref 22–41)
MAGNESIUM SERPL-MCNC: 1.6 MG/DL (ref 1.5–2.5)
MCH RBC QN AUTO: 31.5 PG (ref 27–33)
MCHC RBC AUTO-ENTMCNC: 33.8 G/DL (ref 33.7–35.3)
MCV RBC AUTO: 93.2 FL (ref 81.4–97.8)
MONOCYTES # BLD AUTO: 0.55 K/UL (ref 0–0.85)
MONOCYTES NFR BLD AUTO: 5.7 % (ref 0–13.4)
NEUTROPHILS # BLD AUTO: 7.57 K/UL (ref 1.82–7.42)
NEUTROPHILS NFR BLD: 78.5 % (ref 44–72)
NRBC # BLD AUTO: 0 K/UL
NRBC BLD AUTO-RTO: 0 /100 WBC
PHOSPHATE SERPL-MCNC: 2.5 MG/DL (ref 2.5–4.5)
PLATELET # BLD AUTO: 129 K/UL (ref 164–446)
PMV BLD AUTO: 11.7 FL (ref 9–12.9)
POTASSIUM SERPL-SCNC: 3.2 MMOL/L (ref 3.6–5.5)
PROCALCITONIN SERPL-MCNC: 4.37 NG/ML
PROCALCITONIN SERPL-MCNC: 4.96 NG/ML
RBC # BLD AUTO: 4.1 M/UL (ref 4.7–6.1)
SIGNIFICANT IND 70042: NORMAL
SITE SITE: NORMAL
SOURCE SOURCE: NORMAL
TROPONIN I SERPL-MCNC: <0.01 NG/ML (ref 0–0.04)
WBC # BLD AUTO: 9.6 K/UL (ref 4.8–10.8)

## 2017-09-09 PROCEDURE — A9270 NON-COVERED ITEM OR SERVICE: HCPCS | Performed by: INTERNAL MEDICINE

## 2017-09-09 PROCEDURE — 83735 ASSAY OF MAGNESIUM: CPT

## 2017-09-09 PROCEDURE — 85025 COMPLETE CBC W/AUTO DIFF WBC: CPT

## 2017-09-09 PROCEDURE — 700102 HCHG RX REV CODE 250 W/ 637 OVERRIDE(OP): Performed by: HOSPITALIST

## 2017-09-09 PROCEDURE — 36415 COLL VENOUS BLD VENIPUNCTURE: CPT

## 2017-09-09 PROCEDURE — 700102 HCHG RX REV CODE 250 W/ 637 OVERRIDE(OP): Performed by: INTERNAL MEDICINE

## 2017-09-09 PROCEDURE — A9270 NON-COVERED ITEM OR SERVICE: HCPCS | Performed by: HOSPITALIST

## 2017-09-09 PROCEDURE — 700111 HCHG RX REV CODE 636 W/ 250 OVERRIDE (IP): Performed by: INTERNAL MEDICINE

## 2017-09-09 PROCEDURE — 700105 HCHG RX REV CODE 258: Performed by: INTERNAL MEDICINE

## 2017-09-09 PROCEDURE — 93005 ELECTROCARDIOGRAM TRACING: CPT | Performed by: HOSPITALIST

## 2017-09-09 PROCEDURE — 99232 SBSQ HOSP IP/OBS MODERATE 35: CPT | Performed by: HOSPITALIST

## 2017-09-09 PROCEDURE — 84132 ASSAY OF SERUM POTASSIUM: CPT

## 2017-09-09 PROCEDURE — 770020 HCHG ROOM/CARE - TELE (206)

## 2017-09-09 PROCEDURE — 84100 ASSAY OF PHOSPHORUS: CPT

## 2017-09-09 PROCEDURE — 84484 ASSAY OF TROPONIN QUANT: CPT

## 2017-09-09 PROCEDURE — 93010 ELECTROCARDIOGRAM REPORT: CPT | Performed by: INTERNAL MEDICINE

## 2017-09-09 PROCEDURE — 84145 PROCALCITONIN (PCT): CPT

## 2017-09-09 RX ORDER — POTASSIUM CHLORIDE 20 MEQ/1
20 TABLET, EXTENDED RELEASE ORAL 2 TIMES DAILY
Status: DISCONTINUED | OUTPATIENT
Start: 2017-09-09 | End: 2017-09-15 | Stop reason: HOSPADM

## 2017-09-09 RX ORDER — NITROGLYCERIN 0.4 MG/1
0.4 TABLET SUBLINGUAL
Status: DISCONTINUED | OUTPATIENT
Start: 2017-09-09 | End: 2017-09-15 | Stop reason: HOSPADM

## 2017-09-09 RX ADMIN — NITROGLYCERIN 0.4 MG: 0.4 TABLET SUBLINGUAL at 12:40

## 2017-09-09 RX ADMIN — AMPICILLIN SODIUM AND SULBACTAM SODIUM 3 G: 2; 1 INJECTION, POWDER, FOR SOLUTION INTRAMUSCULAR; INTRAVENOUS at 05:27

## 2017-09-09 RX ADMIN — STANDARDIZED SENNA CONCENTRATE AND DOCUSATE SODIUM 2 TABLET: 8.6; 5 TABLET, FILM COATED ORAL at 08:35

## 2017-09-09 RX ADMIN — MORPHINE SULFATE 2 MG: 4 INJECTION INTRAVENOUS at 12:19

## 2017-09-09 RX ADMIN — AZITHROMYCIN 250 MG: 250 TABLET, FILM COATED ORAL at 08:34

## 2017-09-09 RX ADMIN — POTASSIUM CHLORIDE 20 MEQ: 1500 TABLET, EXTENDED RELEASE ORAL at 08:35

## 2017-09-09 RX ADMIN — AMPICILLIN SODIUM AND SULBACTAM SODIUM 3 G: 2; 1 INJECTION, POWDER, FOR SOLUTION INTRAMUSCULAR; INTRAVENOUS at 17:36

## 2017-09-09 RX ADMIN — OXYCODONE HYDROCHLORIDE 5 MG: 5 TABLET ORAL at 19:37

## 2017-09-09 RX ADMIN — LORAZEPAM 0.5 MG: 0.5 TABLET ORAL at 19:37

## 2017-09-09 RX ADMIN — LIDOCAINE HYDROCHLORIDE 15 ML: 20 SOLUTION OROPHARYNGEAL at 11:38

## 2017-09-09 RX ADMIN — SODIUM CHLORIDE: 9 INJECTION, SOLUTION INTRAVENOUS at 16:28

## 2017-09-09 RX ADMIN — AMPICILLIN SODIUM AND SULBACTAM SODIUM 3 G: 2; 1 INJECTION, POWDER, FOR SOLUTION INTRAMUSCULAR; INTRAVENOUS at 13:17

## 2017-09-09 RX ADMIN — THERA TABS 1 TABLET: TAB at 08:35

## 2017-09-09 RX ADMIN — LORAZEPAM 0.5 MG: 0.5 TABLET ORAL at 08:35

## 2017-09-09 RX ADMIN — ENOXAPARIN SODIUM 40 MG: 100 INJECTION SUBCUTANEOUS at 20:33

## 2017-09-09 RX ADMIN — ACETAMINOPHEN 650 MG: 325 TABLET, FILM COATED ORAL at 08:35

## 2017-09-09 RX ADMIN — LORAZEPAM 1 MG: 1 TABLET ORAL at 00:31

## 2017-09-09 RX ADMIN — POTASSIUM CHLORIDE 20 MEQ: 1500 TABLET, EXTENDED RELEASE ORAL at 20:33

## 2017-09-09 RX ADMIN — NICOTINE 14 MG: 14 PATCH, EXTENDED RELEASE TRANSDERMAL at 05:28

## 2017-09-09 RX ADMIN — SODIUM CHLORIDE: 9 INJECTION, SOLUTION INTRAVENOUS at 05:22

## 2017-09-09 RX ADMIN — Medication 100 MG: at 08:35

## 2017-09-09 RX ADMIN — AMPICILLIN SODIUM AND SULBACTAM SODIUM 3 G: 2; 1 INJECTION, POWDER, FOR SOLUTION INTRAMUSCULAR; INTRAVENOUS at 23:37

## 2017-09-09 RX ADMIN — FOLIC ACID 1 MG: 1 TABLET ORAL at 08:35

## 2017-09-09 ASSESSMENT — LIFESTYLE VARIABLES
ORIENTATION AND CLOUDING OF SENSORIUM: ORIENTED AND CAN DO SERIAL ADDITIONS
TREMOR: NO TREMOR
AUDITORY DISTURBANCES: NOT PRESENT
TOTAL SCORE: 6
AGITATION: SOMEWHAT MORE THAN NORMAL ACTIVITY
NAUSEA AND VOMITING: NO NAUSEA AND NO VOMITING
ORIENTATION AND CLOUDING OF SENSORIUM: ORIENTED AND CAN DO SERIAL ADDITIONS
TREMOR: *
ANXIETY: MILDLY ANXIOUS
TOTAL SCORE: VERY MILD ITCHING, PINS AND NEEDLES SENSATION, BURNING OR NUMBNESS
HEADACHE, FULLNESS IN HEAD: MODERATE
ANXIETY: MILDLY ANXIOUS
TOTAL SCORE: VERY MILD ITCHING, PINS AND NEEDLES SENSATION, BURNING OR NUMBNESS
PAROXYSMAL SWEATS: NO SWEAT VISIBLE
TREMOR: *
PAROXYSMAL SWEATS: NO SWEAT VISIBLE
NAUSEA AND VOMITING: NO NAUSEA AND NO VOMITING
NAUSEA AND VOMITING: MILD NAUSEA WITH NO VOMITING
AGITATION: SOMEWHAT MORE THAN NORMAL ACTIVITY
ANXIETY: MILDLY ANXIOUS
TREMOR: NO TREMOR
NAUSEA AND VOMITING: NO NAUSEA AND NO VOMITING
ORIENTATION AND CLOUDING OF SENSORIUM: ORIENTED AND CAN DO SERIAL ADDITIONS
ORIENTATION AND CLOUDING OF SENSORIUM: ORIENTED AND CAN DO SERIAL ADDITIONS
NAUSEA AND VOMITING: NO NAUSEA AND NO VOMITING
HEADACHE, FULLNESS IN HEAD: VERY MILD
PAROXYSMAL SWEATS: NO SWEAT VISIBLE
HEADACHE, FULLNESS IN HEAD: MILD
AGITATION: SOMEWHAT MORE THAN NORMAL ACTIVITY
HEADACHE, FULLNESS IN HEAD: NOT PRESENT
TOTAL SCORE: 3
TOTAL SCORE: 4
VISUAL DISTURBANCES: NOT PRESENT
VISUAL DISTURBANCES: NOT PRESENT
TREMOR: NO TREMOR
ANXIETY: MILDLY ANXIOUS
PAROXYSMAL SWEATS: NO SWEAT VISIBLE
VISUAL DISTURBANCES: NOT PRESENT
ORIENTATION AND CLOUDING OF SENSORIUM: ORIENTED AND CAN DO SERIAL ADDITIONS
AGITATION: SOMEWHAT MORE THAN NORMAL ACTIVITY
AGITATION: NORMAL ACTIVITY
AUDITORY DISTURBANCES: NOT PRESENT
AGITATION: NORMAL ACTIVITY
AUDITORY DISTURBANCES: NOT PRESENT
HEADACHE, FULLNESS IN HEAD: NOT PRESENT
AUDITORY DISTURBANCES: NOT PRESENT
TREMOR: TREMOR NOT VISIBLE BUT CAN BE FELT, FINGERTIP TO FINGERTIP
NAUSEA AND VOMITING: NO NAUSEA AND NO VOMITING
TOTAL SCORE: 5
AUDITORY DISTURBANCES: NOT PRESENT
ANXIETY: NO ANXIETY (AT EASE)
TOTAL SCORE: 4
VISUAL DISTURBANCES: NOT PRESENT
TOTAL SCORE: 1
AUDITORY DISTURBANCES: NOT PRESENT
ANXIETY: NO ANXIETY (AT EASE)
PAROXYSMAL SWEATS: NO SWEAT VISIBLE
VISUAL DISTURBANCES: NOT PRESENT
ORIENTATION AND CLOUDING OF SENSORIUM: ORIENTED AND CAN DO SERIAL ADDITIONS
PAROXYSMAL SWEATS: NO SWEAT VISIBLE
HEADACHE, FULLNESS IN HEAD: VERY MILD
VISUAL DISTURBANCES: NOT PRESENT

## 2017-09-09 ASSESSMENT — PAIN SCALES - GENERAL
PAINLEVEL_OUTOF10: 0
PAINLEVEL_OUTOF10: 0
PAINLEVEL_OUTOF10: 2
PAINLEVEL_OUTOF10: 8
PAINLEVEL_OUTOF10: 0
PAINLEVEL_OUTOF10: 7
PAINLEVEL_OUTOF10: 8
PAINLEVEL_OUTOF10: 7
PAINLEVEL_OUTOF10: 7
PAINLEVEL_OUTOF10: 3
PAINLEVEL_OUTOF10: 3

## 2017-09-09 ASSESSMENT — ENCOUNTER SYMPTOMS
FEVER: 0
HEARTBURN: 0
PHOTOPHOBIA: 0

## 2017-09-09 NOTE — PROGRESS NOTES
Pt still experiencing chest pain 6/10. Pt describes it as a pressure. Administered morphine. Doctor notified, orders received for stat troponin and nitroglycerin.

## 2017-09-09 NOTE — PROGRESS NOTES
Report received and pt assessed. Pt resting quietly in bed, easily aroused. No needs voiced at this time. Belongings and call light in reach. Safety maintained, will continue to monitor.

## 2017-09-09 NOTE — PROGRESS NOTES
[facility] Hospitalist Progress Note    Date of Service: 2017    Chief Complaint  66 y.o. male admitted 2017 with sepsis due to pneumonia.  Interval Problem Update  Feeling better but still weak no fever    Consultants/Specialty  none    Disposition  Continue monitoring.         Review of Systems   Constitutional: Negative for fever.   Eyes: Negative for photophobia.   Cardiovascular: Negative for chest pain.   Gastrointestinal: Negative for heartburn.   Genitourinary: Negative for dysuria.   Skin: Negative for rash.   Neurological: Negative for headaches.      Physical Exam  Laboratory/Imaging   Hemodynamics  Temp (24hrs), Av.7 °C (98.1 °F), Min:36.3 °C (97.4 °F), Max:37.1 °C (98.7 °F)   Temperature: 36.9 °C (98.4 °F)  Pulse  Av  Min: 61  Max: 98    Blood Pressure : 145/80      Respiratory      Respiration: 16, Pulse Oximetry: 95 %        RUL Breath Sounds: Clear, RML Breath Sounds: Diminished, RLL Breath Sounds: Diminished, DEWAYNE Breath Sounds: Clear, LLL Breath Sounds: Diminished    Fluids    Intake/Output Summary (Last 24 hours) at 17 1808  Last data filed at 17 0200   Gross per 24 hour   Intake                0 ml   Output              650 ml   Net             -650 ml       Nutrition  Orders Placed This Encounter   Procedures   • Diet Order     Standing Status:   Standing     Number of Occurrences:   1     Order Specific Question:   Diet:     Answer:   Regular [1]     Physical Exam   Constitutional: He is oriented to person, place, and time. He appears well-developed. No distress.   HENT:   Head: Normocephalic.   Eyes: Pupils are equal, round, and reactive to light. No scleral icterus.   Neck: Normal range of motion. No JVD present.   Cardiovascular: Normal rate and regular rhythm.    Pulmonary/Chest: Effort normal and breath sounds normal. No respiratory distress. He has no wheezes.   Abdominal: Soft. Bowel sounds are normal. He exhibits no distension. There is no tenderness.    Lymphadenopathy:     He has no cervical adenopathy.   Neurological: He is alert and oriented to person, place, and time. He exhibits normal muscle tone.       Recent Labs      09/07/17   0508  09/08/17   0422   WBC  18.5*  16.7*   RBC  4.72  4.27*   HEMOGLOBIN  14.4  13.0*   HEMATOCRIT  43.2  39.5*   MCV  91.5  92.5   MCH  30.5  30.4   MCHC  33.3*  32.9*   RDW  43.8  44.7   PLATELETCT  139*  122*   MPV  11.9  12.3     Recent Labs      09/07/17   0508  09/08/17   0422   SODIUM  139  138   POTASSIUM  3.4*  3.4*   CHLORIDE  109  108   CO2  18*  23   GLUCOSE  98  93   BUN  6*  8   CREATININE  0.76  0.94   CALCIUM  8.7  7.9*                      Assessment/Plan     Alcohol withdrawal (CMS-[facility])   Assessment & Plan    On CIWA protocol. Stable.         Smoking- (present on admission)   Assessment & Plan    Smoking secession education provided  nicotine patch        Lactic acidosis- (present on admission)   Assessment & Plan    Lactic 3.4  From sepsis  9/8 resolved.         Transaminitis- (present on admission)   Assessment & Plan    From etoh  9/8 stable        Hypokalemia- (present on admission)   Assessment & Plan    K 3.4  Po replace  Check mg  9/8 replacing.        Leukocytosis- (present on admission)   Assessment & Plan    From sepsis and pna  Recheck in am  Culture pending  9/8 trending down no fever.        Fever- (present on admission)   Assessment & Plan    From PNA  Iv abx  9/8 unasyn and azithromycin         Liver disease- (present on admission)   Assessment & Plan    Likely from ETOH  Mild LFT elevation  Close monitor  9/8 stable.         PNA (pneumonia)- (present on admission)   Assessment & Plan    XR confirmed PNA  Iv unasyn and azithro  Culture pending  Check procalcitonin  9/8 improving. Continue atb. cx negative.         Sepsis (CMS-[facility])- (present on admission)   Assessment & Plan    This is sepsis (without associated acute organ dysfunction).   Lactic 3.4  We will put the patient on full sepsis protocol  including lactic acid checks  ivf  Iv abx unasyn and azithro  Culture pending  9/8 resolved.             Reviewed items::  EKG reviewed, Labs reviewed, Medications reviewed and Radiology images reviewed  Pedersen catheter::  No Pedersen  DVT prophylaxis pharmacological::  Heparin  Antibiotics:  Treating active infection/contamination beyond 24 hours perioperative coverage

## 2017-09-09 NOTE — PROGRESS NOTES
"Pt complaining of 6/10 chest pain. Pt describes it as \"painful when I breath.\" Doctor Lynnette notified. Stat Ekg ordered. No observable ST changes, pt in SR. GI cocktail administered.   "

## 2017-09-09 NOTE — PROGRESS NOTES
Received report from Ashley WEBBER. Assumed pt care. Assessment completed. A&Ox4. Pain 8/10 in head.    Pt is currently in bed. Bed alarm on, call light within reach, pt calls appropriately and does not get out of bed. RN and CNA numbers provided, no further needs at this time. Hourly rounding in progress.

## 2017-09-09 NOTE — RESPIRATORY CARE
COPD EDUCATION by COPD CLINICAL EDUCATOR  9/9/2017 at 6:57 AM by Anika Hough     Patient reviewed by COPD education team. Patient does not qualify for COPD program.

## 2017-09-09 NOTE — CARE PLAN
Problem: Safety  Goal: Will remain free from injury    Intervention: Provide assistance with mobility  Pt utilizes call light for assistance. Treaded socks on, Bed in lowest position and locked. Personal belongings and call light in reach. Safety ongoing.       Problem: Knowledge Deficit  Goal: Knowledge of disease process/condition, treatment plan, diagnostic tests, and medications will improve    Intervention: Assess knowledge level of disease process/condition, treatment plan, diagnostic tests, and medications  Plan of care and medication administration discussed with pt, pt verbalized understanding.

## 2017-09-10 ENCOUNTER — PATIENT OUTREACH (OUTPATIENT)
Dept: HEALTH INFORMATION MANAGEMENT | Facility: OTHER | Age: 67
End: 2017-09-10

## 2017-09-10 LAB
BASOPHILS # BLD AUTO: 0.4 % (ref 0–1.8)
BASOPHILS # BLD: 0.04 K/UL (ref 0–0.12)
EOSINOPHIL # BLD AUTO: 0.15 K/UL (ref 0–0.51)
EOSINOPHIL NFR BLD: 1.6 % (ref 0–6.9)
ERYTHROCYTE [DISTWIDTH] IN BLOOD BY AUTOMATED COUNT: 43.2 FL (ref 35.9–50)
HCT VFR BLD AUTO: 40.6 % (ref 42–52)
HGB BLD-MCNC: 13.6 G/DL (ref 14–18)
IMM GRANULOCYTES # BLD AUTO: 0.04 K/UL (ref 0–0.11)
IMM GRANULOCYTES NFR BLD AUTO: 0.4 % (ref 0–0.9)
LYMPHOCYTES # BLD AUTO: 1.59 K/UL (ref 1–4.8)
LYMPHOCYTES NFR BLD: 16.4 % (ref 22–41)
MAGNESIUM SERPL-MCNC: 1.5 MG/DL (ref 1.5–2.5)
MCH RBC QN AUTO: 30.7 PG (ref 27–33)
MCHC RBC AUTO-ENTMCNC: 33.5 G/DL (ref 33.7–35.3)
MCV RBC AUTO: 91.6 FL (ref 81.4–97.8)
MONOCYTES # BLD AUTO: 0.59 K/UL (ref 0–0.85)
MONOCYTES NFR BLD AUTO: 6.1 % (ref 0–13.4)
NEUTROPHILS # BLD AUTO: 7.26 K/UL (ref 1.82–7.42)
NEUTROPHILS NFR BLD: 75.1 % (ref 44–72)
NRBC # BLD AUTO: 0 K/UL
NRBC BLD AUTO-RTO: 0 /100 WBC
PHOSPHATE SERPL-MCNC: 3.1 MG/DL (ref 2.5–4.5)
PLATELET # BLD AUTO: 165 K/UL (ref 164–446)
PMV BLD AUTO: 12.1 FL (ref 9–12.9)
RBC # BLD AUTO: 4.43 M/UL (ref 4.7–6.1)
WBC # BLD AUTO: 9.7 K/UL (ref 4.8–10.8)

## 2017-09-10 PROCEDURE — A9270 NON-COVERED ITEM OR SERVICE: HCPCS | Performed by: INTERNAL MEDICINE

## 2017-09-10 PROCEDURE — 83735 ASSAY OF MAGNESIUM: CPT

## 2017-09-10 PROCEDURE — A9270 NON-COVERED ITEM OR SERVICE: HCPCS | Performed by: HOSPITALIST

## 2017-09-10 PROCEDURE — 700105 HCHG RX REV CODE 258: Performed by: HOSPITALIST

## 2017-09-10 PROCEDURE — 700102 HCHG RX REV CODE 250 W/ 637 OVERRIDE(OP): Performed by: HOSPITALIST

## 2017-09-10 PROCEDURE — 700111 HCHG RX REV CODE 636 W/ 250 OVERRIDE (IP): Performed by: INTERNAL MEDICINE

## 2017-09-10 PROCEDURE — 700102 HCHG RX REV CODE 250 W/ 637 OVERRIDE(OP): Performed by: INTERNAL MEDICINE

## 2017-09-10 PROCEDURE — 770020 HCHG ROOM/CARE - TELE (206)

## 2017-09-10 PROCEDURE — 84100 ASSAY OF PHOSPHORUS: CPT

## 2017-09-10 PROCEDURE — 85025 COMPLETE CBC W/AUTO DIFF WBC: CPT

## 2017-09-10 PROCEDURE — 99232 SBSQ HOSP IP/OBS MODERATE 35: CPT | Performed by: HOSPITALIST

## 2017-09-10 PROCEDURE — 36415 COLL VENOUS BLD VENIPUNCTURE: CPT

## 2017-09-10 PROCEDURE — 700105 HCHG RX REV CODE 258: Performed by: INTERNAL MEDICINE

## 2017-09-10 RX ORDER — GUAIFENESIN/DEXTROMETHORPHAN 100-10MG/5
5 SYRUP ORAL EVERY 6 HOURS PRN
Status: DISCONTINUED | OUTPATIENT
Start: 2017-09-10 | End: 2017-09-10

## 2017-09-10 RX ORDER — GUAIFENESIN/DEXTROMETHORPHAN 100-10MG/5
5 SYRUP ORAL EVERY 6 HOURS
Status: DISCONTINUED | OUTPATIENT
Start: 2017-09-10 | End: 2017-09-15 | Stop reason: HOSPADM

## 2017-09-10 RX ADMIN — ACETAMINOPHEN 650 MG: 325 TABLET, FILM COATED ORAL at 12:25

## 2017-09-10 RX ADMIN — GUAIFENESIN AND DEXTROMETHORPHAN 5 ML: 100; 10 SYRUP ORAL at 18:14

## 2017-09-10 RX ADMIN — NICOTINE 14 MG: 14 PATCH, EXTENDED RELEASE TRANSDERMAL at 05:15

## 2017-09-10 RX ADMIN — AZITHROMYCIN 250 MG: 250 TABLET, FILM COATED ORAL at 08:21

## 2017-09-10 RX ADMIN — POTASSIUM CHLORIDE 20 MEQ: 1500 TABLET, EXTENDED RELEASE ORAL at 08:21

## 2017-09-10 RX ADMIN — ACETAMINOPHEN 650 MG: 325 TABLET, FILM COATED ORAL at 20:59

## 2017-09-10 RX ADMIN — AMPICILLIN SODIUM AND SULBACTAM SODIUM 3 G: 2; 1 INJECTION, POWDER, FOR SOLUTION INTRAMUSCULAR; INTRAVENOUS at 05:15

## 2017-09-10 RX ADMIN — LORAZEPAM 0.5 MG: 0.5 TABLET ORAL at 12:25

## 2017-09-10 RX ADMIN — Medication 100 MG: at 08:21

## 2017-09-10 RX ADMIN — LORAZEPAM 1 MG: 1 TABLET ORAL at 20:59

## 2017-09-10 RX ADMIN — AMPICILLIN SODIUM AND SULBACTAM SODIUM 3 G: 2; 1 INJECTION, POWDER, FOR SOLUTION INTRAMUSCULAR; INTRAVENOUS at 18:14

## 2017-09-10 RX ADMIN — SODIUM CHLORIDE: 9 INJECTION, SOLUTION INTRAVENOUS at 16:11

## 2017-09-10 RX ADMIN — ENOXAPARIN SODIUM 40 MG: 100 INJECTION SUBCUTANEOUS at 20:59

## 2017-09-10 RX ADMIN — SODIUM CHLORIDE: 9 INJECTION, SOLUTION INTRAVENOUS at 01:41

## 2017-09-10 RX ADMIN — FOLIC ACID 1 MG: 1 TABLET ORAL at 08:21

## 2017-09-10 RX ADMIN — THERA TABS 1 TABLET: TAB at 08:21

## 2017-09-10 RX ADMIN — POTASSIUM CHLORIDE 20 MEQ: 1500 TABLET, EXTENDED RELEASE ORAL at 20:59

## 2017-09-10 RX ADMIN — AMPICILLIN SODIUM AND SULBACTAM SODIUM 3 G: 2; 1 INJECTION, POWDER, FOR SOLUTION INTRAMUSCULAR; INTRAVENOUS at 12:25

## 2017-09-10 ASSESSMENT — LIFESTYLE VARIABLES
NAUSEA AND VOMITING: NO NAUSEA AND NO VOMITING
AGITATION: NORMAL ACTIVITY
TOTAL SCORE: 1
PAROXYSMAL SWEATS: NO SWEAT VISIBLE
TREMOR: NO TREMOR
ANXIETY: NO ANXIETY (AT EASE)
VISUAL DISTURBANCES: NOT PRESENT
PAROXYSMAL SWEATS: NO SWEAT VISIBLE
PAROXYSMAL SWEATS: BARELY PERCEPTIBLE SWEATING. PALMS MOIST
TOTAL SCORE: 1
HEADACHE, FULLNESS IN HEAD: NOT PRESENT
NAUSEA AND VOMITING: MILD NAUSEA WITH NO VOMITING
ORIENTATION AND CLOUDING OF SENSORIUM: ORIENTED AND CAN DO SERIAL ADDITIONS
ANXIETY: *
AUDITORY DISTURBANCES: NOT PRESENT
HEADACHE, FULLNESS IN HEAD: NOT PRESENT
AGITATION: NORMAL ACTIVITY
AGITATION: NORMAL ACTIVITY
TOTAL SCORE: VERY MILD ITCHING, PINS AND NEEDLES SENSATION, BURNING OR NUMBNESS
TOTAL SCORE: 2
ANXIETY: MILDLY ANXIOUS
TOTAL SCORE: 3
AGITATION: NORMAL ACTIVITY
VISUAL DISTURBANCES: NOT PRESENT
NAUSEA AND VOMITING: NO NAUSEA AND NO VOMITING
ANXIETY: MILDLY ANXIOUS
ORIENTATION AND CLOUDING OF SENSORIUM: ORIENTED AND CAN DO SERIAL ADDITIONS
HEADACHE, FULLNESS IN HEAD: MODERATE
TREMOR: NO TREMOR
HEADACHE, FULLNESS IN HEAD: VERY MILD
VISUAL DISTURBANCES: NOT PRESENT
ORIENTATION AND CLOUDING OF SENSORIUM: ORIENTED AND CAN DO SERIAL ADDITIONS
ORIENTATION AND CLOUDING OF SENSORIUM: ORIENTED AND CAN DO SERIAL ADDITIONS
VISUAL DISTURBANCES: NOT PRESENT
PAROXYSMAL SWEATS: NO SWEAT VISIBLE
ANXIETY: *
ORIENTATION AND CLOUDING OF SENSORIUM: ORIENTED AND CAN DO SERIAL ADDITIONS
AUDITORY DISTURBANCES: NOT PRESENT
ORIENTATION AND CLOUDING OF SENSORIUM: ORIENTED AND CAN DO SERIAL ADDITIONS
AUDITORY DISTURBANCES: NOT PRESENT
TREMOR: NO TREMOR
TREMOR: NO TREMOR
TREMOR: *
NAUSEA AND VOMITING: NO NAUSEA AND NO VOMITING
VISUAL DISTURBANCES: NOT PRESENT
NAUSEA AND VOMITING: NO NAUSEA AND NO VOMITING
PAROXYSMAL SWEATS: NO SWEAT VISIBLE
VISUAL DISTURBANCES: VERY MILD SENSITIVITY
TREMOR: TREMOR NOT VISIBLE BUT CAN BE FELT, FINGERTIP TO FINGERTIP
HEADACHE, FULLNESS IN HEAD: VERY MILD
AUDITORY DISTURBANCES: NOT PRESENT
AGITATION: SOMEWHAT MORE THAN NORMAL ACTIVITY
HEADACHE, FULLNESS IN HEAD: MILD
AUDITORY DISTURBANCES: NOT PRESENT
AGITATION: SOMEWHAT MORE THAN NORMAL ACTIVITY
TOTAL SCORE: 7
NAUSEA AND VOMITING: NO NAUSEA AND NO VOMITING
ANXIETY: NO ANXIETY (AT EASE)
PAROXYSMAL SWEATS: NO SWEAT VISIBLE
AUDITORY DISTURBANCES: NOT PRESENT
TOTAL SCORE: 8

## 2017-09-10 ASSESSMENT — ENCOUNTER SYMPTOMS
HEMOPTYSIS: 0
CHILLS: 1
FEVER: 0
PHOTOPHOBIA: 0
DIZZINESS: 0
COUGH: 1

## 2017-09-10 ASSESSMENT — PAIN SCALES - GENERAL
PAINLEVEL_OUTOF10: 0
PAINLEVEL_OUTOF10: 5
PAINLEVEL_OUTOF10: 0

## 2017-09-10 NOTE — PROGRESS NOTES
[facility]ist Progress Note    Date of Service: 2017    Chief Complaint  66 y.o. male admitted 2017 with sepsis due to pneumonia.  Interval Problem Update  Feeling better, he wants help with his drinking, he wants to stop. Told him will provide him with information.    Consultants/Specialty  none    Disposition  Continue monitoring.         Review of Systems   Constitutional: Negative for fever.   Eyes: Negative for photophobia.   Cardiovascular: Negative for chest pain.   Gastrointestinal: Negative for heartburn.   Genitourinary: Negative for dysuria.   Skin: Negative for rash.      Physical Exam  Laboratory/Imaging   Hemodynamics  Temp (24hrs), Av.5 °C (97.7 °F), Min:36.2 °C (97.2 °F), Max:36.9 °C (98.4 °F)   Temperature: 36.5 °C (97.7 °F)  Pulse  Av.5  Min: 58  Max: 98    Blood Pressure : 122/74      Respiratory      Respiration: 18, Pulse Oximetry: 92 %     Work Of Breathing / Effort: Mild  RUL Breath Sounds: Expiratory Wheezes, RML Breath Sounds: Expiratory Wheezes, RLL Breath Sounds: Diminished, DEWAYNE Breath Sounds: Expiratory Wheezes, LLL Breath Sounds: Diminished    Fluids    Intake/Output Summary (Last 24 hours) at 17 1712  Last data filed at 17 0825   Gross per 24 hour   Intake             1470 ml   Output             2325 ml   Net             -855 ml       Nutrition  Orders Placed This Encounter   Procedures   • Diet Order     Standing Status:   Standing     Number of Occurrences:   1     Order Specific Question:   Diet:     Answer:   Regular [1]     Physical Exam   Constitutional: He is oriented to person, place, and time. He appears well-developed. No distress.   HENT:   Head: Normocephalic.   Eyes: Pupils are equal, round, and reactive to light. No scleral icterus.   Neck: Normal range of motion. No JVD present.   Cardiovascular: Normal rate and regular rhythm.    Pulmonary/Chest: Effort normal and breath sounds normal. No respiratory distress. He has no wheezes.    Abdominal: Soft. Bowel sounds are normal. He exhibits no distension. There is no tenderness.   Lymphadenopathy:     He has no cervical adenopathy.   Neurological: He is alert and oriented to person, place, and time. He exhibits normal muscle tone.       Recent Labs      09/07/17   0508  09/08/17   0422 09/09/17   1058   WBC  18.5*  16.7*  9.6   RBC  4.72  4.27*  4.10*   HEMOGLOBIN  14.4  13.0*  12.9*   HEMATOCRIT  43.2  39.5*  38.2*   MCV  91.5  92.5  93.2   MCH  30.5  30.4  31.5   MCHC  33.3*  32.9*  33.8   RDW  43.8  44.7  44.4   PLATELETCT  139*  122*  129*   MPV  11.9  12.3  11.7     Recent Labs      09/07/17   0508  09/08/17   0422  09/09/17   1058   SODIUM  139  138   --    POTASSIUM  3.4*  3.4*  3.2*   CHLORIDE  109  108   --    CO2  18*  23   --    GLUCOSE  98  93   --    BUN  6*  8   --    CREATININE  0.76  0.94   --    CALCIUM  8.7  7.9*   --                       Assessment/Plan     Alcohol withdrawal (CMS-[facility])   Assessment & Plan    On CIWA protocol. Stable.   9/9 ciwa is low.         Smoking- (present on admission)   Assessment & Plan    Smoking secession education provided  nicotine patch        Lactic acidosis- (present on admission)   Assessment & Plan    Lactic 3.4  From sepsis  9/8 resolved.         Transaminitis- (present on admission)   Assessment & Plan    From etoh  9/8 stable        Hypokalemia- (present on admission)   Assessment & Plan    K 3.4  Po replace  Check mg  9/8 replacing.  9/9 resolved.         Leukocytosis- (present on admission)   Assessment & Plan    From sepsis and pna  Recheck in am  Culture pending  9/8 trending down no fever.  9/9 stable.        Fever- (present on admission)   Assessment & Plan    From PNA  Iv abx  9/8 unasyn and azithromycin   9/9 resolved.         Liver disease- (present on admission)   Assessment & Plan    Likely from ETOH  Mild LFT elevation  Close monitor  9/8 stable.         PNA (pneumonia)- (present on admission)   Assessment & Plan    XR confirmed  PNA  Iv unasyn and azithro  Culture pending  Check procalcitonin  9/8 improving. Continue atb. cx negative.   9/9 resolving.         Sepsis (CMS-HCC)- (present on admission)   Assessment & Plan    This is sepsis (without associated acute organ dysfunction).   Lactic 3.4  We will put the patient on full sepsis protocol including lactic acid checks  ivf  Iv abx unasyn and azithro  Culture pending  9/8 resolved.             Reviewed items::  Labs reviewed, Medications reviewed and Radiology images reviewed  Pedersen catheter::  No Pedersen  DVT prophylaxis pharmacological::  Heparin  Antibiotics:  Treating active infection/contamination beyond 24 hours perioperative coverage

## 2017-09-10 NOTE — PROGRESS NOTES
[facility]    Date of Service: 9/10/2017    Chief Complaint  66 y.o. male admitted 2017 with sepsis due to pneumonia.  Interval Problem Update  Feeling better, no new complains.   Consultants/Specialty  none    Disposition  Continue monitoring.         Review of Systems   Constitutional: Positive for chills. Negative for fever.   Eyes: Negative for photophobia.   Respiratory: Positive for cough. Negative for hemoptysis.    Cardiovascular: Negative for chest pain.   Genitourinary: Negative for dysuria.   Skin: Negative for rash.   Neurological: Negative for dizziness.      Physical Exam  Laboratory/Imaging   Hemodynamics  Temp (24hrs), Av.7 °C (98.1 °F), Min:36.5 °C (97.7 °F), Max:37.5 °C (99.5 °F)   Temperature: 36.6 °C (97.8 °F)  Pulse  Av.8  Min: 58  Max: 98    Blood Pressure : 127/75      Respiratory      Respiration: 18, Pulse Oximetry: 93 %     Work Of Breathing / Effort: Mild  RUL Breath Sounds: Clear, RML Breath Sounds: Diminished, RLL Breath Sounds: Diminished, DEWAYNE Breath Sounds: Clear, LLL Breath Sounds: Diminished    Fluids    Intake/Output Summary (Last 24 hours) at 09/10/17 1606  Last data filed at 09/10/17 1100   Gross per 24 hour   Intake             2040 ml   Output             3700 ml   Net            -1660 ml       Nutrition  Orders Placed This Encounter   Procedures   • Diet Order     Standing Status:   Standing     Number of Occurrences:   1     Order Specific Question:   Diet:     Answer:   Regular [1]     Physical Exam   Constitutional: He is oriented to person, place, and time. He appears well-developed. No distress.   HENT:   Head: Normocephalic.   Eyes: Pupils are equal, round, and reactive to light. No scleral icterus.   Neck: Normal range of motion. No JVD present.   Cardiovascular: Normal rate and regular rhythm.    Pulmonary/Chest: Effort normal and breath sounds normal. No respiratory distress. He has no wheezes.   Abdominal: Soft. Bowel sounds are  normal. He exhibits no distension. There is no tenderness.   Lymphadenopathy:     He has no cervical adenopathy.   Neurological: He is alert and oriented to person, place, and time. He exhibits normal muscle tone.   Nursing note and vitals reviewed.      Recent Labs      09/08/17 0422 09/09/17   1058  09/10/17   0253   WBC  16.7*  9.6  9.7   RBC  4.27*  4.10*  4.43*   HEMOGLOBIN  13.0*  12.9*  13.6*   HEMATOCRIT  39.5*  38.2*  40.6*   MCV  92.5  93.2  91.6   MCH  30.4  31.5  30.7   MCHC  32.9*  33.8  33.5*   RDW  44.7  44.4  43.2   PLATELETCT  122*  129*  165   MPV  12.3  11.7  12.1     Recent Labs      09/08/17 0422 09/09/17   1058   SODIUM  138   --    POTASSIUM  3.4*  3.2*   CHLORIDE  108   --    CO2  23   --    GLUCOSE  93   --    BUN  8   --    CREATININE  0.94   --    CALCIUM  7.9*   --                       Assessment/Plan     Alcohol withdrawal (CMS-[facility])- (present on admission)   Assessment & Plan    On CIWA protocol. Stable.           Smoking- (present on admission)   Assessment & Plan    Smoking secession education provided  nicotine patch        Lactic acidosis- (present on admission)   Assessment & Plan    From sepsis, back to normal        Transaminitis- (present on admission)   Assessment & Plan    From etoh  stable        Hypokalemia- (present on admission)   Assessment & Plan    Resolved.          Leukocytosis- (present on admission)   Assessment & Plan    From sepsis, pneumonia, resolved.         Fever- (present on admission)   Assessment & Plan    From PNA, resolved.         Liver disease- (present on admission)   Assessment & Plan    From alcohol abuse.   Stable.          PNA (pneumonia)- (present on admission)   Assessment & Plan    Improved, off o2, continue atb, cx negative.         Sepsis (CMS-[facility])- (present on admission)   Assessment & Plan    This is sepsis (without associated acute organ dysfunction).   Lactic 3.4  We will put the patient on full sepsis protocol including lactic acid  checks  ivf  Iv abx unasyn and azithro  Culture neg   resolved.             Reviewed items::  Labs reviewed, Medications reviewed and Radiology images reviewed  Pedersen catheter::  No Pedersen  DVT prophylaxis pharmacological::  Heparin  Antibiotics:  Treating active infection/contamination beyond 24 hours perioperative coverage

## 2017-09-10 NOTE — PROGRESS NOTES
Pt IV dislodged at this time and refused to have it replaced. Explained the importance of the pt having an IV access while being on the heart monitor and to receive medication, pt verbalized understanding. Pt stated he will inform primary RN when he is ready to have it replaced.

## 2017-09-10 NOTE — PROGRESS NOTES
Received report from Ashley WEBBER. Assumed pt care. Assessment completed. A&Ox4. Pt denies pain. Pt reports dyspnea upon exertion.   Pt is currently in bed. Bed alarm on, call light within reach, pt calls appropriately and does not get out of bed. RN and CNA numbers provided, no further needs at this time. Hourly rounding in progress.

## 2017-09-10 NOTE — PROGRESS NOTES
Report received and pt assessed. Pt with c/o headache at this time and visible tremors. Medicated pt with pain medication and ativan, see MAR. No other needs voiced. Belongings and call light in reach. Safety maintained, will continue to monitor.

## 2017-09-10 NOTE — CARE PLAN
Problem: Safety  Goal: Will remain free from injury  Outcome: PROGRESSING AS EXPECTED  Pt calls for assistance. Bed in lowest position and locked. Treaded socks on and bed alarm activated. Belongings and call light in reach. Will continue to monitor, safety maintained.     Problem: Bowel/Gastric:  Goal: Normal bowel function is maintained or improved    Intervention: Educate patient and significant other/support system about diet, fluid intake, medications and activity to promote bowel function  Pt has positive bowels sounds in all four quadrants with a regular bowel regimen.

## 2017-09-11 LAB
BASOPHILS # BLD AUTO: 0.4 % (ref 0–1.8)
BASOPHILS # BLD: 0.03 K/UL (ref 0–0.12)
EOSINOPHIL # BLD AUTO: 0.19 K/UL (ref 0–0.51)
EOSINOPHIL NFR BLD: 2.4 % (ref 0–6.9)
ERYTHROCYTE [DISTWIDTH] IN BLOOD BY AUTOMATED COUNT: 43 FL (ref 35.9–50)
HCT VFR BLD AUTO: 40.2 % (ref 42–52)
HGB BLD-MCNC: 13.4 G/DL (ref 14–18)
IMM GRANULOCYTES # BLD AUTO: 0.04 K/UL (ref 0–0.11)
IMM GRANULOCYTES NFR BLD AUTO: 0.5 % (ref 0–0.9)
LYMPHOCYTES # BLD AUTO: 1.91 K/UL (ref 1–4.8)
LYMPHOCYTES NFR BLD: 24.2 % (ref 22–41)
MCH RBC QN AUTO: 30.7 PG (ref 27–33)
MCHC RBC AUTO-ENTMCNC: 33.3 G/DL (ref 33.7–35.3)
MCV RBC AUTO: 92.2 FL (ref 81.4–97.8)
MONOCYTES # BLD AUTO: 1.01 K/UL (ref 0–0.85)
MONOCYTES NFR BLD AUTO: 12.8 % (ref 0–13.4)
NEUTROPHILS # BLD AUTO: 4.72 K/UL (ref 1.82–7.42)
NEUTROPHILS NFR BLD: 59.7 % (ref 44–72)
NRBC # BLD AUTO: 0 K/UL
NRBC BLD AUTO-RTO: 0 /100 WBC
PLATELET # BLD AUTO: 186 K/UL (ref 164–446)
PMV BLD AUTO: 12.1 FL (ref 9–12.9)
RBC # BLD AUTO: 4.36 M/UL (ref 4.7–6.1)
WBC # BLD AUTO: 7.9 K/UL (ref 4.8–10.8)

## 2017-09-11 PROCEDURE — G8988 SELF CARE GOAL STATUS: HCPCS | Mod: CI

## 2017-09-11 PROCEDURE — 700102 HCHG RX REV CODE 250 W/ 637 OVERRIDE(OP): Performed by: INTERNAL MEDICINE

## 2017-09-11 PROCEDURE — 770020 HCHG ROOM/CARE - TELE (206)

## 2017-09-11 PROCEDURE — G8978 MOBILITY CURRENT STATUS: HCPCS | Mod: CJ

## 2017-09-11 PROCEDURE — 97162 PT EVAL MOD COMPLEX 30 MIN: CPT

## 2017-09-11 PROCEDURE — 85025 COMPLETE CBC W/AUTO DIFF WBC: CPT

## 2017-09-11 PROCEDURE — 700105 HCHG RX REV CODE 258: Performed by: HOSPITALIST

## 2017-09-11 PROCEDURE — 700102 HCHG RX REV CODE 250 W/ 637 OVERRIDE(OP): Performed by: HOSPITALIST

## 2017-09-11 PROCEDURE — 36415 COLL VENOUS BLD VENIPUNCTURE: CPT

## 2017-09-11 PROCEDURE — G8987 SELF CARE CURRENT STATUS: HCPCS | Mod: CK

## 2017-09-11 PROCEDURE — G8979 MOBILITY GOAL STATUS: HCPCS | Mod: CI

## 2017-09-11 PROCEDURE — A9270 NON-COVERED ITEM OR SERVICE: HCPCS | Performed by: INTERNAL MEDICINE

## 2017-09-11 PROCEDURE — 700111 HCHG RX REV CODE 636 W/ 250 OVERRIDE (IP): Performed by: INTERNAL MEDICINE

## 2017-09-11 PROCEDURE — 99232 SBSQ HOSP IP/OBS MODERATE 35: CPT | Performed by: HOSPITALIST

## 2017-09-11 PROCEDURE — A9270 NON-COVERED ITEM OR SERVICE: HCPCS | Performed by: HOSPITALIST

## 2017-09-11 PROCEDURE — 700105 HCHG RX REV CODE 258: Performed by: INTERNAL MEDICINE

## 2017-09-11 PROCEDURE — 97165 OT EVAL LOW COMPLEX 30 MIN: CPT

## 2017-09-11 RX ADMIN — ACETAMINOPHEN 650 MG: 325 TABLET, FILM COATED ORAL at 20:32

## 2017-09-11 RX ADMIN — GUAIFENESIN AND DEXTROMETHORPHAN 5 ML: 100; 10 SYRUP ORAL at 00:33

## 2017-09-11 RX ADMIN — AMPICILLIN SODIUM AND SULBACTAM SODIUM 3 G: 2; 1 INJECTION, POWDER, FOR SOLUTION INTRAMUSCULAR; INTRAVENOUS at 17:42

## 2017-09-11 RX ADMIN — POTASSIUM CHLORIDE 20 MEQ: 1500 TABLET, EXTENDED RELEASE ORAL at 20:25

## 2017-09-11 RX ADMIN — NICOTINE 14 MG: 14 PATCH, EXTENDED RELEASE TRANSDERMAL at 06:14

## 2017-09-11 RX ADMIN — GUAIFENESIN AND DEXTROMETHORPHAN 5 ML: 100; 10 SYRUP ORAL at 13:08

## 2017-09-11 RX ADMIN — AMPICILLIN SODIUM AND SULBACTAM SODIUM 3 G: 2; 1 INJECTION, POWDER, FOR SOLUTION INTRAMUSCULAR; INTRAVENOUS at 00:33

## 2017-09-11 RX ADMIN — LORAZEPAM 1 MG: 1 TABLET ORAL at 10:10

## 2017-09-11 RX ADMIN — AZITHROMYCIN 250 MG: 250 TABLET, FILM COATED ORAL at 08:09

## 2017-09-11 RX ADMIN — POTASSIUM CHLORIDE 20 MEQ: 1500 TABLET, EXTENDED RELEASE ORAL at 08:09

## 2017-09-11 RX ADMIN — LORAZEPAM 1 MG: 1 TABLET ORAL at 20:32

## 2017-09-11 RX ADMIN — AMPICILLIN SODIUM AND SULBACTAM SODIUM 3 G: 2; 1 INJECTION, POWDER, FOR SOLUTION INTRAMUSCULAR; INTRAVENOUS at 13:08

## 2017-09-11 RX ADMIN — SODIUM CHLORIDE: 9 INJECTION, SOLUTION INTRAVENOUS at 17:45

## 2017-09-11 RX ADMIN — AMPICILLIN SODIUM AND SULBACTAM SODIUM 3 G: 2; 1 INJECTION, POWDER, FOR SOLUTION INTRAMUSCULAR; INTRAVENOUS at 06:14

## 2017-09-11 RX ADMIN — GUAIFENESIN AND DEXTROMETHORPHAN 5 ML: 100; 10 SYRUP ORAL at 06:14

## 2017-09-11 RX ADMIN — ENOXAPARIN SODIUM 40 MG: 100 INJECTION SUBCUTANEOUS at 20:26

## 2017-09-11 RX ADMIN — GUAIFENESIN AND DEXTROMETHORPHAN 5 ML: 100; 10 SYRUP ORAL at 17:42

## 2017-09-11 ASSESSMENT — LIFESTYLE VARIABLES
HEADACHE, FULLNESS IN HEAD: VERY MILD
PAROXYSMAL SWEATS: NO SWEAT VISIBLE
ANXIETY: MILDLY ANXIOUS
AUDITORY DISTURBANCES: NOT PRESENT
AGITATION: NORMAL ACTIVITY
ANXIETY: MILDLY ANXIOUS
NAUSEA AND VOMITING: NO NAUSEA AND NO VOMITING
ORIENTATION AND CLOUDING OF SENSORIUM: ORIENTED AND CAN DO SERIAL ADDITIONS
TOTAL SCORE: 4
HEADACHE, FULLNESS IN HEAD: MILD
TREMOR: *
PAROXYSMAL SWEATS: NO SWEAT VISIBLE
PAROXYSMAL SWEATS: NO SWEAT VISIBLE
ORIENTATION AND CLOUDING OF SENSORIUM: ORIENTED AND CAN DO SERIAL ADDITIONS
AUDITORY DISTURBANCES: NOT PRESENT
AUDITORY DISTURBANCES: NOT PRESENT
TOTAL SCORE: VERY MILD ITCHING, PINS AND NEEDLES SENSATION, BURNING OR NUMBNESS
TREMOR: TREMOR NOT VISIBLE BUT CAN BE FELT, FINGERTIP TO FINGERTIP
ANXIETY: *
VISUAL DISTURBANCES: NOT PRESENT
HEADACHE, FULLNESS IN HEAD: VERY MILD
TOTAL SCORE: 4
TREMOR: NO TREMOR
ANXIETY: *
AGITATION: NORMAL ACTIVITY
PAROXYSMAL SWEATS: NO SWEAT VISIBLE
VISUAL DISTURBANCES: NOT PRESENT
TOTAL SCORE: VERY MILD ITCHING, PINS AND NEEDLES SENSATION, BURNING OR NUMBNESS
AUDITORY DISTURBANCES: NOT PRESENT
ORIENTATION AND CLOUDING OF SENSORIUM: ORIENTED AND CAN DO SERIAL ADDITIONS
TREMOR: TREMOR NOT VISIBLE BUT CAN BE FELT, FINGERTIP TO FINGERTIP
VISUAL DISTURBANCES: NOT PRESENT
PAROXYSMAL SWEATS: NO SWEAT VISIBLE
ORIENTATION AND CLOUDING OF SENSORIUM: ORIENTED AND CAN DO SERIAL ADDITIONS
TREMOR: NO TREMOR
AGITATION: SOMEWHAT MORE THAN NORMAL ACTIVITY
AGITATION: NORMAL ACTIVITY
ORIENTATION AND CLOUDING OF SENSORIUM: ORIENTED AND CAN DO SERIAL ADDITIONS
HEADACHE, FULLNESS IN HEAD: VERY MILD
ANXIETY: MILDLY ANXIOUS
VISUAL DISTURBANCES: NOT PRESENT
TOTAL SCORE: 4
TOTAL SCORE: 5
AUDITORY DISTURBANCES: NOT PRESENT
NAUSEA AND VOMITING: NO NAUSEA AND NO VOMITING
TOTAL SCORE: 3
NAUSEA AND VOMITING: NO NAUSEA AND NO VOMITING
VISUAL DISTURBANCES: NOT PRESENT
NAUSEA AND VOMITING: NO NAUSEA AND NO VOMITING
AGITATION: NORMAL ACTIVITY
HEADACHE, FULLNESS IN HEAD: VERY MILD
NAUSEA AND VOMITING: NO NAUSEA AND NO VOMITING

## 2017-09-11 ASSESSMENT — COGNITIVE AND FUNCTIONAL STATUS - GENERAL
TURNING FROM BACK TO SIDE WHILE IN FLAT BAD: A LITTLE
EATING MEALS: A LITTLE
MOVING FROM LYING ON BACK TO SITTING ON SIDE OF FLAT BED: A LITTLE
DAILY ACTIVITIY SCORE: 18
SUGGESTED CMS G CODE MODIFIER DAILY ACTIVITY: CK
HELP NEEDED FOR BATHING: A LITTLE
PERSONAL GROOMING: A LITTLE
DRESSING REGULAR LOWER BODY CLOTHING: A LITTLE
WALKING IN HOSPITAL ROOM: A LOT
MOVING TO AND FROM BED TO CHAIR: A LITTLE
DRESSING REGULAR UPPER BODY CLOTHING: A LITTLE
TOILETING: A LITTLE
STANDING UP FROM CHAIR USING ARMS: A LITTLE

## 2017-09-11 ASSESSMENT — ENCOUNTER SYMPTOMS
PHOTOPHOBIA: 0
BLURRED VISION: 0
FEVER: 0
DIZZINESS: 0
HEMOPTYSIS: 0
CHILLS: 0
COUGH: 1

## 2017-09-11 ASSESSMENT — GAIT ASSESSMENTS
GAIT LEVEL OF ASSIST: CONTACT GUARD ASSIST
ASSISTIVE DEVICE: SINGLE POINT CANE
DEVIATION: DECREASED BASE OF SUPPORT
DISTANCE (FEET): 100

## 2017-09-11 ASSESSMENT — ACTIVITIES OF DAILY LIVING (ADL): TOILETING: INDEPENDENT

## 2017-09-11 ASSESSMENT — PAIN SCALES - GENERAL
PAINLEVEL_OUTOF10: 0
PAINLEVEL_OUTOF10: 0
PAINLEVEL_OUTOF10: 1
PAINLEVEL_OUTOF10: 1

## 2017-09-11 NOTE — THERAPY
"Occupational Therapy Evaluation completed.   Functional Status:  Supervision sitting ADLs, MIN assist standing ADLs. CGA ambulation with SC.  Plan of Care: Will benefit from Occupational Therapy 3 times per week  Discharge Recommendations:  Equipment: Will Continue to Assess for Equipment Needs. Post-acute therapy Discharge to a transitional care facility for continued skilled therapy services.    Pt is a 65 y/o male admitted with pneumonia, sepsis, and alcohol withdraw. H/o alcoholic liver disease and falls. Pt requiring MIN assist for sit to stand due to LE stiffness / soreness. Pt with impaired activity tolerance and balance limiting safety and independence in ADLS and mobility. Pt would benefit from d/c to a transitional care facility for continued therapy at d/c. Pt is homeless and has no support in the area. Pt is interested in assistance for alcohol addiction. Pt would benefit from continued skilled OT to maximize safety and independence in ADLS and mobility prior to d/c.     See \"Rehab Therapy-Acute\" Patient Summary Report for complete documentation.    "

## 2017-09-11 NOTE — CARE PLAN
Problem: Safety  Goal: Will remain free from injury  Outcome: PROGRESSING AS EXPECTED  Bed locked in lowest postion, call light in reach, patient educated on fall precautions. Pt states understanding use of call light and having assistance when mobilizing. Walking in banks with PT now.     Problem: Psychosocial Needs:  Goal: Level of anxiety will decrease  Outcome: PROGRESSING AS EXPECTED  Patient mildly anxious, scoring 4-5 CIWA. Medicated per MAR and protocol. Will continue to educate patient on methods to reduce anxiety and medicate when necessary.

## 2017-09-11 NOTE — THERAPY
"Physical Therapy Evaluation completed.   Bed Mobility:  Supine to Sit: Minimal Assist  Transfers: Sit to Stand: Minimal Assist  Gait: Level Of Assist: Contact Guard Assist with Single Point Cane       Plan of Care: Will benefit from Physical Therapy 3 times per week  Discharge Recommendations: Equipment: Will Continue to Assess for Equipment Needs. Post-acute therapy Discharge to a transitional care facility for continued skilled therapy services.    Pt presents with decreased functional mobiltiy most likely due to instabiltiy in standing and ease of fatigue. Pt requires most assist during sit to stand where he requires extra time to achieve upright and steadying assist to prevent LOB. During gait, demonstrates flexed trunk and knee posture which worsens as distance progresses. He is resistant to using FWW due to not wanting to \"look old\". He will benefit from further acute skilled PT services to improve functional mobiltiy and could benefit from placement to decrease fall risk. He expressed motivation to improve.     See \"Rehab Therapy-Acute\" Patient Summary Report for complete documentation.     "

## 2017-09-11 NOTE — PROGRESS NOTES
Pt lying in bed comfortably. Denies pain. AOx4. Appears mildly anxious, will continue Q4 CIWA. Bed locked in lowest position, call light in reach, bed alarm on. All patient needs met at this time.

## 2017-09-11 NOTE — CARE PLAN
Problem: Safety  Goal: Will remain free from falls    Intervention: Implement fall precautions   09/08/17 0800 09/10/17 2000   OTHER   Environmental Precautions --  Treaded Slipper Socks on Patient;Personal Belongings, Wastebasket, Call Bell etc. in Easy Reach;Report Given to Other Health Care Providers Regarding Fall Risk;Bed in Low Position;Communication Sign for Patients & Families;Mobility Assessed & Appropriate Sign Placed   IV Pole on Same Side of Bed as Bathroom --  Yes   Bedrails --  Bedrails Closest to Bathroom Down   Chair/Bed Strip Alarm --  Yes - Alarm On   Bed Alarm (Built in - for ICU ONLY) Yes - Alarm On --

## 2017-09-11 NOTE — CARE PLAN
Problem: Venous Thromboembolism (VTW)/Deep Vein Thrombosis (DVT) Prevention:  Goal: Patient will participate in Venous Thrombosis (VTE)/Deep Vein Thrombosis (DVT)Prevention Measures    Intervention: Assess and monitor for anticoagulation complications  Monitoring for bleeding and clots. No signs of bleeding. Will continue administering Lovenox SQ.       Problem: Discharge Barriers/Planning  Goal: Patient's continuum of care needs will be met    Intervention: Collaborate with Transitional Care Team and Interdisciplinary Team to meet discharge needs  Collaborated with Dr and social work on ETOH counseling post hospital stay. Will update interdisciplinary team as needed.

## 2017-09-11 NOTE — CARE PLAN
Problem: Respiratory:  Goal: Respiratory status will improve    Intervention: Assess and monitor pulmonary status   09/10/17 0800 09/10/17 2000 09/11/17 0320   OTHER   O2 (LPM) --  --  0   Work Of Breathing / Effort Mild --  --    RUL Breath Sounds --  Clear --    RML Breath Sounds --  Diminished --    RLL Breath Sounds --  Diminished --    DEWAYNE Breath Sounds --  Clear --    LLL Breath Sounds --  Diminished --    Vitals   Respiration --  --  18   Pulse Oximetry --  --  97 %   Respiratory   Cough Productive;Moist --  --

## 2017-09-12 LAB
BACTERIA BLD CULT: NORMAL
BACTERIA BLD CULT: NORMAL
BASOPHILS # BLD AUTO: 0.5 % (ref 0–1.8)
BASOPHILS # BLD: 0.05 K/UL (ref 0–0.12)
EOSINOPHIL # BLD AUTO: 0.28 K/UL (ref 0–0.51)
EOSINOPHIL NFR BLD: 2.9 % (ref 0–6.9)
ERYTHROCYTE [DISTWIDTH] IN BLOOD BY AUTOMATED COUNT: 41.7 FL (ref 35.9–50)
HCT VFR BLD AUTO: 39.5 % (ref 42–52)
HGB BLD-MCNC: 13.3 G/DL (ref 14–18)
IMM GRANULOCYTES # BLD AUTO: 0.06 K/UL (ref 0–0.11)
IMM GRANULOCYTES NFR BLD AUTO: 0.6 % (ref 0–0.9)
LYMPHOCYTES # BLD AUTO: 2.04 K/UL (ref 1–4.8)
LYMPHOCYTES NFR BLD: 20.9 % (ref 22–41)
MCH RBC QN AUTO: 30.9 PG (ref 27–33)
MCHC RBC AUTO-ENTMCNC: 33.7 G/DL (ref 33.7–35.3)
MCV RBC AUTO: 91.6 FL (ref 81.4–97.8)
MONOCYTES # BLD AUTO: 1.36 K/UL (ref 0–0.85)
MONOCYTES NFR BLD AUTO: 13.9 % (ref 0–13.4)
NEUTROPHILS # BLD AUTO: 5.98 K/UL (ref 1.82–7.42)
NEUTROPHILS NFR BLD: 61.2 % (ref 44–72)
NRBC # BLD AUTO: 0 K/UL
NRBC BLD AUTO-RTO: 0 /100 WBC
PLATELET # BLD AUTO: 189 K/UL (ref 164–446)
PMV BLD AUTO: 11.4 FL (ref 9–12.9)
RBC # BLD AUTO: 4.31 M/UL (ref 4.7–6.1)
SIGNIFICANT IND 70042: NORMAL
SIGNIFICANT IND 70042: NORMAL
SITE SITE: NORMAL
SITE SITE: NORMAL
SOURCE SOURCE: NORMAL
SOURCE SOURCE: NORMAL
WBC # BLD AUTO: 9.8 K/UL (ref 4.8–10.8)

## 2017-09-12 PROCEDURE — 770020 HCHG ROOM/CARE - TELE (206)

## 2017-09-12 PROCEDURE — 36415 COLL VENOUS BLD VENIPUNCTURE: CPT

## 2017-09-12 PROCEDURE — 99232 SBSQ HOSP IP/OBS MODERATE 35: CPT | Performed by: HOSPITALIST

## 2017-09-12 PROCEDURE — A9270 NON-COVERED ITEM OR SERVICE: HCPCS | Performed by: HOSPITALIST

## 2017-09-12 PROCEDURE — 700102 HCHG RX REV CODE 250 W/ 637 OVERRIDE(OP): Performed by: INTERNAL MEDICINE

## 2017-09-12 PROCEDURE — A9270 NON-COVERED ITEM OR SERVICE: HCPCS | Performed by: INTERNAL MEDICINE

## 2017-09-12 PROCEDURE — 85025 COMPLETE CBC W/AUTO DIFF WBC: CPT

## 2017-09-12 PROCEDURE — 700102 HCHG RX REV CODE 250 W/ 637 OVERRIDE(OP): Performed by: HOSPITALIST

## 2017-09-12 PROCEDURE — 700105 HCHG RX REV CODE 258: Performed by: INTERNAL MEDICINE

## 2017-09-12 PROCEDURE — 700111 HCHG RX REV CODE 636 W/ 250 OVERRIDE (IP): Performed by: INTERNAL MEDICINE

## 2017-09-12 RX ORDER — AMOXICILLIN AND CLAVULANATE POTASSIUM 875; 125 MG/1; MG/1
1 TABLET, FILM COATED ORAL 2 TIMES DAILY
Qty: 14 TAB | Refills: 0 | Status: SHIPPED | OUTPATIENT
Start: 2017-09-12 | End: 2017-09-15

## 2017-09-12 RX ADMIN — AMPICILLIN SODIUM AND SULBACTAM SODIUM 3 G: 2; 1 INJECTION, POWDER, FOR SOLUTION INTRAMUSCULAR; INTRAVENOUS at 11:23

## 2017-09-12 RX ADMIN — NICOTINE 14 MG: 14 PATCH, EXTENDED RELEASE TRANSDERMAL at 05:47

## 2017-09-12 RX ADMIN — GUAIFENESIN AND DEXTROMETHORPHAN 5 ML: 100; 10 SYRUP ORAL at 05:47

## 2017-09-12 RX ADMIN — GUAIFENESIN AND DEXTROMETHORPHAN 5 ML: 100; 10 SYRUP ORAL at 11:23

## 2017-09-12 RX ADMIN — AMPICILLIN SODIUM AND SULBACTAM SODIUM 3 G: 2; 1 INJECTION, POWDER, FOR SOLUTION INTRAMUSCULAR; INTRAVENOUS at 23:56

## 2017-09-12 RX ADMIN — POTASSIUM CHLORIDE 20 MEQ: 1500 TABLET, EXTENDED RELEASE ORAL at 21:17

## 2017-09-12 RX ADMIN — ENOXAPARIN SODIUM 40 MG: 100 INJECTION SUBCUTANEOUS at 21:17

## 2017-09-12 RX ADMIN — POTASSIUM CHLORIDE 20 MEQ: 1500 TABLET, EXTENDED RELEASE ORAL at 09:12

## 2017-09-12 RX ADMIN — STANDARDIZED SENNA CONCENTRATE AND DOCUSATE SODIUM 2 TABLET: 8.6; 5 TABLET, FILM COATED ORAL at 09:12

## 2017-09-12 RX ADMIN — AMPICILLIN SODIUM AND SULBACTAM SODIUM 3 G: 2; 1 INJECTION, POWDER, FOR SOLUTION INTRAMUSCULAR; INTRAVENOUS at 05:46

## 2017-09-12 RX ADMIN — AMPICILLIN SODIUM AND SULBACTAM SODIUM 3 G: 2; 1 INJECTION, POWDER, FOR SOLUTION INTRAMUSCULAR; INTRAVENOUS at 17:50

## 2017-09-12 RX ADMIN — AMPICILLIN SODIUM AND SULBACTAM SODIUM 3 G: 2; 1 INJECTION, POWDER, FOR SOLUTION INTRAMUSCULAR; INTRAVENOUS at 00:00

## 2017-09-12 RX ADMIN — GUAIFENESIN AND DEXTROMETHORPHAN 5 ML: 100; 10 SYRUP ORAL at 17:53

## 2017-09-12 RX ADMIN — LORAZEPAM 1 MG: 1 TABLET ORAL at 21:17

## 2017-09-12 ASSESSMENT — LIFESTYLE VARIABLES
ORIENTATION AND CLOUDING OF SENSORIUM: ORIENTED AND CAN DO SERIAL ADDITIONS
TREMOR: TREMOR NOT VISIBLE BUT CAN BE FELT, FINGERTIP TO FINGERTIP
HEADACHE, FULLNESS IN HEAD: NOT PRESENT
AGITATION: NORMAL ACTIVITY
TREMOR: *
AUDITORY DISTURBANCES: NOT PRESENT
AUDITORY DISTURBANCES: NOT PRESENT
VISUAL DISTURBANCES: NOT PRESENT
ORIENTATION AND CLOUDING OF SENSORIUM: ORIENTED AND CAN DO SERIAL ADDITIONS
HEADACHE, FULLNESS IN HEAD: VERY MILD
TOTAL SCORE: 5
PAROXYSMAL SWEATS: NO SWEAT VISIBLE
ORIENTATION AND CLOUDING OF SENSORIUM: ORIENTED AND CAN DO SERIAL ADDITIONS
VISUAL DISTURBANCES: NOT PRESENT
NAUSEA AND VOMITING: NO NAUSEA AND NO VOMITING
TREMOR: *
AUDITORY DISTURBANCES: NOT PRESENT
HEADACHE, FULLNESS IN HEAD: VERY MILD
AUDITORY DISTURBANCES: NOT PRESENT
ORIENTATION AND CLOUDING OF SENSORIUM: ORIENTED AND CAN DO SERIAL ADDITIONS
AUDITORY DISTURBANCES: NOT PRESENT
NAUSEA AND VOMITING: NO NAUSEA AND NO VOMITING
ANXIETY: *
PAROXYSMAL SWEATS: *
VISUAL DISTURBANCES: NOT PRESENT
ORIENTATION AND CLOUDING OF SENSORIUM: ORIENTED AND CAN DO SERIAL ADDITIONS
HEADACHE, FULLNESS IN HEAD: NOT PRESENT
PAROXYSMAL SWEATS: NO SWEAT VISIBLE
ANXIETY: NO ANXIETY (AT EASE)
VISUAL DISTURBANCES: NOT PRESENT
AGITATION: NORMAL ACTIVITY
NAUSEA AND VOMITING: NO NAUSEA AND NO VOMITING
VISUAL DISTURBANCES: NOT PRESENT
HEADACHE, FULLNESS IN HEAD: VERY MILD
AUDITORY DISTURBANCES: NOT PRESENT
PAROXYSMAL SWEATS: NO SWEAT VISIBLE
VISUAL DISTURBANCES: NOT PRESENT
HEADACHE, FULLNESS IN HEAD: VERY MILD
AGITATION: NORMAL ACTIVITY
AGITATION: NORMAL ACTIVITY
TREMOR: *
TOTAL SCORE: 3
TREMOR: *
NAUSEA AND VOMITING: NO NAUSEA AND NO VOMITING
AUDITORY DISTURBANCES: NOT PRESENT
TOTAL SCORE: 8
TOTAL SCORE: 3
PAROXYSMAL SWEATS: NO SWEAT VISIBLE
ANXIETY: *
TREMOR: *
ANXIETY: *
ANXIETY: MILDLY ANXIOUS
PAROXYSMAL SWEATS: NO SWEAT VISIBLE
HEADACHE, FULLNESS IN HEAD: NOT PRESENT
AGITATION: NORMAL ACTIVITY
ANXIETY: *
ANXIETY: MILDLY ANXIOUS
HEADACHE, FULLNESS IN HEAD: NOT PRESENT
AGITATION: NORMAL ACTIVITY
ORIENTATION AND CLOUDING OF SENSORIUM: ORIENTED AND CAN DO SERIAL ADDITIONS
TOTAL SCORE: 5
ORIENTATION AND CLOUDING OF SENSORIUM: ORIENTED AND CAN DO SERIAL ADDITIONS
TOTAL SCORE: 3
ANXIETY: *
AUDITORY DISTURBANCES: NOT PRESENT
NAUSEA AND VOMITING: NO NAUSEA AND NO VOMITING
PAROXYSMAL SWEATS: NO SWEAT VISIBLE
TOTAL SCORE: 1
NAUSEA AND VOMITING: NO NAUSEA AND NO VOMITING
ORIENTATION AND CLOUDING OF SENSORIUM: ORIENTED AND CAN DO SERIAL ADDITIONS
TREMOR: NO TREMOR
TOTAL SCORE: 5
VISUAL DISTURBANCES: NOT PRESENT
VISUAL DISTURBANCES: NOT PRESENT
TREMOR: *
NAUSEA AND VOMITING: NO NAUSEA AND NO VOMITING
PAROXYSMAL SWEATS: NO SWEAT VISIBLE
NAUSEA AND VOMITING: NO NAUSEA AND NO VOMITING

## 2017-09-12 ASSESSMENT — PAIN SCALES - GENERAL
PAINLEVEL_OUTOF10: 0

## 2017-09-12 ASSESSMENT — ENCOUNTER SYMPTOMS
FEVER: 0
BLURRED VISION: 0
HEMOPTYSIS: 0
CHILLS: 0
PHOTOPHOBIA: 0
COUGH: 1
DIZZINESS: 0

## 2017-09-12 NOTE — DISCHARGE PLANNING
Medical Social Work    SW left  for [facility] to see if they will have bed open for pt tomorrow. If not, pt will likely d/c to different facility tomorrow.

## 2017-09-12 NOTE — CARE PLAN
Problem: Infection  Goal: Will remain free from infection  Outcome: PROGRESSING AS EXPECTED    Intervention: Assess signs and symptoms of infection  Patient on abt, labs monitored per md order, has remained afebrile      Problem: Knowledge Deficit  Goal: Knowledge of the prescribed therapeutic regimen will improve  Outcome: PROGRESSING AS EXPECTED    Intervention: Discuss information regarding therpeutic regimen and document in education  Educated on plan of care, encouraged to ask questions

## 2017-09-12 NOTE — PROGRESS NOTES
[facility]    Date of Service: 2017    Chief Complaint  66 y.o. male admitted 2017 with sepsis due to pneumonia.  Interval Problem Update    No new complains, still coughing, no fever.  States he 's  Homeless.     Consultants/Specialty  none    Disposition  snf        Review of Systems   Constitutional: Negative for chills and fever.   HENT: Positive for congestion.    Eyes: Negative for blurred vision and photophobia.   Respiratory: Positive for cough and sputum production (mild). Negative for hemoptysis and shortness of breath.    Cardiovascular: Negative for chest pain.   Gastrointestinal: Negative for abdominal pain, diarrhea and vomiting.   Genitourinary: Negative for dysuria, flank pain and hematuria.   Musculoskeletal: Negative for back pain and myalgias.   Skin: Negative for itching and rash.   Neurological: Positive for weakness. Negative for dizziness, tingling, focal weakness and headaches.   Psychiatric/Behavioral: Negative for hallucinations. The patient is nervous/anxious.       Physical Exam  Laboratory/Imaging   Hemodynamics  Temp (24hrs), Av.9 °C (98.5 °F), Min:36.8 °C (98.3 °F), Max:37.1 °C (98.8 °F)   Temperature: 36.9 °C (98.5 °F)  Pulse  Av.4  Min: 58  Max: 98    Blood Pressure : 121/71      Respiratory      Respiration: 18, Pulse Oximetry: 94 %        RUL Breath Sounds: Clear, RML Breath Sounds: Diminished, RLL Breath Sounds: Diminished, DEWAYNE Breath Sounds: Clear, LLL Breath Sounds: Diminished    Fluids    Intake/Output Summary (Last 24 hours) at 17 1310  Last data filed at 17 0830   Gross per 24 hour   Intake                0 ml   Output             2650 ml   Net            -2650 ml       Nutrition  Orders Placed This Encounter   Procedures   • Diet Order     Standing Status:   Standing     Number of Occurrences:   1     Order Specific Question:   Diet:     Answer:   Regular [1]     Physical Exam   Constitutional: He is oriented to person,  place, and time. No distress.   HENT:   Head: Normocephalic and atraumatic.   Eyes: Pupils are equal, round, and reactive to light. No scleral icterus.   Neck: Normal range of motion. No JVD present.   Cardiovascular: Normal rate and regular rhythm.    No murmur heard.  Pulmonary/Chest: Effort normal. No respiratory distress. He has no wheezes. He has no rales.   Abdominal: Soft. Bowel sounds are normal. He exhibits no distension. There is no tenderness.   Musculoskeletal: He exhibits no edema or tenderness.   Lymphadenopathy:     He has no cervical adenopathy.   Neurological: He is alert and oriented to person, place, and time. No cranial nerve deficit. He exhibits normal muscle tone.   Skin: Skin is warm and dry. He is not diaphoretic. No erythema.   Psychiatric: He has a normal mood and affect. His behavior is normal.   Nursing note and vitals reviewed.      Recent Labs      09/10/17   0253  09/11/17   0231  09/12/17   0421   WBC  9.7  7.9  9.8   RBC  4.43*  4.36*  4.31*   HEMOGLOBIN  13.6*  13.4*  13.3*   HEMATOCRIT  40.6*  40.2*  39.5*   MCV  91.6  92.2  91.6   MCH  30.7  30.7  30.9   MCHC  33.5*  33.3*  33.7   RDW  43.2  43.0  41.7   PLATELETCT  165  186  189   MPV  12.1  12.1  11.4                          Assessment/Plan     * Sepsis (CMS-HCC)- (present on admission)   Assessment & Plan    This is sepsis (without associated acute organ dysfunction).  Resolved  Follow clinically   cw Iv abx unasyn and azithro          Fever- (present on admission)   Assessment & Plan    attrib to PNA, resolved.   Prn tylenol         PNA (pneumonia)- (present on admission)   Assessment & Plan    Improved, off o2  Clinically better  Iv Unasyn azith for cap coverage.         Transaminitis- (present on admission)   Assessment & Plan    From etoh  stable        Hypokalemia- (present on admission)   Assessment & Plan    Resolved.            Liver disease- (present on admission)   Assessment & Plan    From alcohol abuse.   Stable.           Alcohol withdrawal (CMS-HCC)- (present on admission)   Assessment & Plan    No signif tremors or aloc  On CIWA protocol. Stable.             Smoking- (present on admission)   Assessment & Plan    Smoking secession education provided  nicotine patch        Lactic acidosis- (present on admission)   Assessment & Plan    From sepsis, back to normal        Leukocytosis- (present on admission)   Assessment & Plan    From sepsis, pneumonia, resolved.             Reviewed items::  Labs reviewed, Medications reviewed and Radiology images reviewed  Pedersen catheter::  No Pedersen  DVT prophylaxis pharmacological::  Heparin  Antibiotics:  Treating active infection/contamination beyond 24 hours perioperative coverage      Debility - anticipate tx to snf for rehab, dw

## 2017-09-12 NOTE — DISCHARGE PLANNING
Received call from [place] with [facility] pending bed availability $164.50 on days  as they are not contracted with secondary. Notified ANU Oliva via phone.

## 2017-09-12 NOTE — PROGRESS NOTES
[facility]ist Progress Note    Date of Service: 2017    Chief Complaint  66 y.o. male admitted 2017 with sepsis due to pneumonia.  Interval Problem Update  No new complains, still coughing, no fever.      Consultants/Specialty  none    Disposition  snf        Review of Systems   Constitutional: Negative for chills and fever.   Eyes: Negative for blurred vision and photophobia.   Respiratory: Positive for cough. Negative for hemoptysis.    Cardiovascular: Negative for chest pain.   Genitourinary: Negative for dysuria.   Skin: Negative for rash.   Neurological: Negative for dizziness.      Physical Exam  Laboratory/Imaging   Hemodynamics  Temp (24hrs), Av.7 °C (98 °F), Min:36.3 °C (97.4 °F), Max:37.1 °C (98.8 °F)   Temperature: 37.1 °C (98.8 °F)  Pulse  Av.9  Min: 58  Max: 98    Blood Pressure : 140/80      Respiratory      Respiration: 18, Pulse Oximetry: 94 %     Work Of Breathing / Effort: Mild  RUL Breath Sounds: Clear, RML Breath Sounds: Diminished, RLL Breath Sounds: Diminished, DEWAYNE Breath Sounds: Clear, LLL Breath Sounds: Diminished    Fluids    Intake/Output Summary (Last 24 hours) at 17 1923  Last data filed at 17 1700   Gross per 24 hour   Intake             1660 ml   Output             5550 ml   Net            -3890 ml       Nutrition  Orders Placed This Encounter   Procedures   • Diet Order     Standing Status:   Standing     Number of Occurrences:   1     Order Specific Question:   Diet:     Answer:   Regular [1]     Physical Exam   Constitutional: He is oriented to person, place, and time. He appears well-developed. No distress.   HENT:   Head: Normocephalic.   Eyes: Pupils are equal, round, and reactive to light. No scleral icterus.   Neck: Normal range of motion. No JVD present.   Cardiovascular: Normal rate and regular rhythm.    Pulmonary/Chest: Effort normal and breath sounds normal. No respiratory distress. He has no wheezes. He has no rales.   Abdominal: Soft.  Bowel sounds are normal. He exhibits no distension. There is no tenderness.   Lymphadenopathy:     He has no cervical adenopathy.   Neurological: He is alert and oriented to person, place, and time. He exhibits normal muscle tone.   Nursing note and vitals reviewed.      Recent Labs      09/09/17   1058  09/10/17   0253  09/11/17   0231   WBC  9.6  9.7  7.9   RBC  4.10*  4.43*  4.36*   HEMOGLOBIN  12.9*  13.6*  13.4*   HEMATOCRIT  38.2*  40.6*  40.2*   MCV  93.2  91.6  92.2   MCH  31.5  30.7  30.7   MCHC  33.8  33.5*  33.3*   RDW  44.4  43.2  43.0   PLATELETCT  129*  165  186   MPV  11.7  12.1  12.1     Recent Labs      09/09/17   1058   POTASSIUM  3.2*                      Assessment/Plan     Alcohol withdrawal (CMS-[facility])- (present on admission)   Assessment & Plan    On CIWA protocol. Stable.    ciwa is low doing ok.           Smoking- (present on admission)   Assessment & Plan    Smoking secession education provided  nicotine patch        Lactic acidosis- (present on admission)   Assessment & Plan    From sepsis, back to normal        Transaminitis- (present on admission)   Assessment & Plan    From etoh  stable        Hypokalemia- (present on admission)   Assessment & Plan    Resolved.    Repeat in am.        Leukocytosis- (present on admission)   Assessment & Plan    From sepsis, pneumonia, resolved.         Fever- (present on admission)   Assessment & Plan    From PNA, resolved.         Liver disease- (present on admission)   Assessment & Plan    From alcohol abuse.   Stable.          PNA (pneumonia)- (present on admission)   Assessment & Plan    Improved, off o2, continue atb, cx negative.   Doing much better, no fever, continue plan of care cx negative.         Sepsis (CMS-[facility])- (present on admission)   Assessment & Plan    This is sepsis (without associated acute organ dysfunction).   Lactic 3.4  We will put the patient on full sepsis protocol including lactic acid checks  ivf  Iv abx unasyn and  azithro  Culture neg   resolved.             Reviewed items::  Labs reviewed, Medications reviewed and Radiology images reviewed  Pedersen catheter::  No Pedersen  DVT prophylaxis pharmacological::  Heparin  Antibiotics:  Treating active infection/contamination beyond 24 hours perioperative coverage

## 2017-09-12 NOTE — PROGRESS NOTES
Bedside report completed. Assumed patient care. AOx4. Resting comfortably in bed. Denies pain. Bed locked in lowest position, call light in reach. All patient needs met at this time. Pt asking for SW to see him to help with drinking cessation programs. Will notify SW.

## 2017-09-12 NOTE — PROGRESS NOTES
Patient rested quietly throughout shift, remains on abt, able to voice needs, medicated x1 with p.o ativan right before bedtime, no tremors or agitation noted

## 2017-09-12 NOTE — DISCHARGE PLANNING
Transitional Care Navigator:    TCN met with pt at bedside to discuss transitional care services for discharge planning.  SNF level of care has been recommended.  TCN explained SNF level of care in detail.  Pt in agreement.  Choice is [facility] as FIRST choice and [facility] as SECOND choice.  Choice form completed and faxed to [facility].  IMM also completed.  SW aware.  TCN will follow-up as needed.    Pt states he understands that he is weak at this time and will be going to SNF as short term.

## 2017-09-12 NOTE — FACE TO FACE
Face to Face Supporting Documentation - Home Health    The encounter with this patient was in whole or in part the primary reason for home health admission.    Date of encounter:   Patient:                    MRN:                       YOB: 2017  Chepe Persaud  [id number]  1950     Home health to see patient for:  Physical Therapy evaluation and treatment    Skilled need for:  Comment: weakness due to alcohol related disease, and pneumonia.     Skilled nursing interventions to include:  Comment: needs to continue with pt/ot    Homebound status evidenced by:  Needs the assistance of another person in order to leave the home. Leaving home requires a considerable and taxing effort. There is a normal inability to leave the home.    Community Physician to provide follow up care: Pcp Pt States None     Optional Interventions? No      I certify the face to face encounter for this home health care referral meets the CMS requirements and the encounter/clinical assessment with the patient was, in whole, or in part, for the medical condition(s) listed above, which is the primary reason for home health care. Based on my clinical findings: the service(s) are medically necessary, support the need for home health care, and the homebound criteria are met.  I certify that this patient has had a face to face encounter by myself.  Joo Church M.D. - NPI: [id number]

## 2017-09-13 LAB
ANISOCYTOSIS BLD QL SMEAR: ABNORMAL
BASOPHILS # BLD AUTO: 0 % (ref 0–1.8)
BASOPHILS # BLD: 0 K/UL (ref 0–0.12)
BURR CELLS BLD QL SMEAR: NORMAL
EOSINOPHIL # BLD AUTO: 0.26 K/UL (ref 0–0.51)
EOSINOPHIL NFR BLD: 2.6 % (ref 0–6.9)
ERYTHROCYTE [DISTWIDTH] IN BLOOD BY AUTOMATED COUNT: 43.8 FL (ref 35.9–50)
HCT VFR BLD AUTO: 38.8 % (ref 42–52)
HGB BLD-MCNC: 13 G/DL (ref 14–18)
LYMPHOCYTES # BLD AUTO: 2.02 K/UL (ref 1–4.8)
LYMPHOCYTES NFR BLD: 20.2 % (ref 22–41)
MACROCYTES BLD QL SMEAR: ABNORMAL
MANUAL DIFF BLD: NORMAL
MCH RBC QN AUTO: 31.3 PG (ref 27–33)
MCHC RBC AUTO-ENTMCNC: 33.5 G/DL (ref 33.7–35.3)
MCV RBC AUTO: 93.3 FL (ref 81.4–97.8)
MONOCYTES # BLD AUTO: 1.23 K/UL (ref 0–0.85)
MONOCYTES NFR BLD AUTO: 12.3 % (ref 0–13.4)
MORPHOLOGY BLD-IMP: NORMAL
NEUTROPHILS # BLD AUTO: 6.49 K/UL (ref 1.82–7.42)
NEUTROPHILS NFR BLD: 64.9 % (ref 44–72)
NRBC # BLD AUTO: 0 K/UL
NRBC BLD AUTO-RTO: 0 /100 WBC
PLATELET # BLD AUTO: 202 K/UL (ref 164–446)
PMV BLD AUTO: 11.7 FL (ref 9–12.9)
POIKILOCYTOSIS BLD QL SMEAR: NORMAL
RBC # BLD AUTO: 4.16 M/UL (ref 4.7–6.1)
RBC BLD AUTO: PRESENT
WBC # BLD AUTO: 10 K/UL (ref 4.8–10.8)

## 2017-09-13 PROCEDURE — 85007 BL SMEAR W/DIFF WBC COUNT: CPT

## 2017-09-13 PROCEDURE — 85027 COMPLETE CBC AUTOMATED: CPT

## 2017-09-13 PROCEDURE — 770020 HCHG ROOM/CARE - TELE (206)

## 2017-09-13 PROCEDURE — 36415 COLL VENOUS BLD VENIPUNCTURE: CPT

## 2017-09-13 PROCEDURE — 700102 HCHG RX REV CODE 250 W/ 637 OVERRIDE(OP): Performed by: INTERNAL MEDICINE

## 2017-09-13 PROCEDURE — 700102 HCHG RX REV CODE 250 W/ 637 OVERRIDE(OP): Performed by: HOSPITALIST

## 2017-09-13 PROCEDURE — A9270 NON-COVERED ITEM OR SERVICE: HCPCS | Performed by: HOSPITALIST

## 2017-09-13 PROCEDURE — 700105 HCHG RX REV CODE 258: Performed by: INTERNAL MEDICINE

## 2017-09-13 PROCEDURE — 99232 SBSQ HOSP IP/OBS MODERATE 35: CPT | Performed by: HOSPITALIST

## 2017-09-13 PROCEDURE — A9270 NON-COVERED ITEM OR SERVICE: HCPCS | Performed by: INTERNAL MEDICINE

## 2017-09-13 PROCEDURE — 700111 HCHG RX REV CODE 636 W/ 250 OVERRIDE (IP): Performed by: INTERNAL MEDICINE

## 2017-09-13 RX ORDER — AMOXICILLIN AND CLAVULANATE POTASSIUM 875; 125 MG/1; MG/1
1 TABLET, FILM COATED ORAL EVERY 12 HOURS
Status: COMPLETED | OUTPATIENT
Start: 2017-09-13 | End: 2017-09-14

## 2017-09-13 RX ADMIN — AMOXICILLIN AND CLAVULANATE POTASSIUM 1 TABLET: 875; 125 TABLET, FILM COATED ORAL at 12:11

## 2017-09-13 RX ADMIN — AMOXICILLIN AND CLAVULANATE POTASSIUM 1 TABLET: 875; 125 TABLET, FILM COATED ORAL at 21:40

## 2017-09-13 RX ADMIN — ENOXAPARIN SODIUM 40 MG: 100 INJECTION SUBCUTANEOUS at 21:40

## 2017-09-13 RX ADMIN — LORAZEPAM 0.5 MG: 0.5 TABLET ORAL at 21:42

## 2017-09-13 RX ADMIN — LORAZEPAM 0.5 MG: 0.5 TABLET ORAL at 16:27

## 2017-09-13 RX ADMIN — AMPICILLIN SODIUM AND SULBACTAM SODIUM 3 G: 2; 1 INJECTION, POWDER, FOR SOLUTION INTRAMUSCULAR; INTRAVENOUS at 06:16

## 2017-09-13 RX ADMIN — GUAIFENESIN AND DEXTROMETHORPHAN 5 ML: 100; 10 SYRUP ORAL at 12:11

## 2017-09-13 RX ADMIN — GUAIFENESIN AND DEXTROMETHORPHAN 5 ML: 100; 10 SYRUP ORAL at 18:19

## 2017-09-13 RX ADMIN — GUAIFENESIN AND DEXTROMETHORPHAN 5 ML: 100; 10 SYRUP ORAL at 23:14

## 2017-09-13 RX ADMIN — GUAIFENESIN AND DEXTROMETHORPHAN 5 ML: 100; 10 SYRUP ORAL at 06:15

## 2017-09-13 RX ADMIN — POTASSIUM CHLORIDE 20 MEQ: 1500 TABLET, EXTENDED RELEASE ORAL at 08:57

## 2017-09-13 RX ADMIN — STANDARDIZED SENNA CONCENTRATE AND DOCUSATE SODIUM 2 TABLET: 8.6; 5 TABLET, FILM COATED ORAL at 08:57

## 2017-09-13 RX ADMIN — NICOTINE 14 MG: 14 PATCH, EXTENDED RELEASE TRANSDERMAL at 06:15

## 2017-09-13 RX ADMIN — POTASSIUM CHLORIDE 20 MEQ: 1500 TABLET, EXTENDED RELEASE ORAL at 21:40

## 2017-09-13 ASSESSMENT — LIFESTYLE VARIABLES
TOTAL SCORE: 6
VISUAL DISTURBANCES: NOT PRESENT
PAROXYSMAL SWEATS: NO SWEAT VISIBLE
ANXIETY: NO ANXIETY (AT EASE)
AUDITORY DISTURBANCES: NOT PRESENT
ANXIETY: *
ORIENTATION AND CLOUDING OF SENSORIUM: ORIENTED AND CAN DO SERIAL ADDITIONS
ANXIETY: NO ANXIETY (AT EASE)
VISUAL DISTURBANCES: NOT PRESENT
TOTAL SCORE: 3
HEADACHE, FULLNESS IN HEAD: MILD
VISUAL DISTURBANCES: NOT PRESENT
HEADACHE, FULLNESS IN HEAD: NOT PRESENT
NAUSEA AND VOMITING: NO NAUSEA AND NO VOMITING
HEADACHE, FULLNESS IN HEAD: NOT PRESENT
AUDITORY DISTURBANCES: NOT PRESENT
AUDITORY DISTURBANCES: NOT PRESENT
ORIENTATION AND CLOUDING OF SENSORIUM: ORIENTED AND CAN DO SERIAL ADDITIONS
NAUSEA AND VOMITING: NO NAUSEA AND NO VOMITING
NAUSEA AND VOMITING: NO NAUSEA AND NO VOMITING
AGITATION: NORMAL ACTIVITY
NAUSEA AND VOMITING: NO NAUSEA AND NO VOMITING
PAROXYSMAL SWEATS: NO SWEAT VISIBLE
TREMOR: *
ANXIETY: NO ANXIETY (AT EASE)
PAROXYSMAL SWEATS: NO SWEAT VISIBLE
ORIENTATION AND CLOUDING OF SENSORIUM: ORIENTED AND CAN DO SERIAL ADDITIONS
TOTAL SCORE: 2
AUDITORY DISTURBANCES: NOT PRESENT
ANXIETY: NO ANXIETY (AT EASE)
VISUAL DISTURBANCES: NOT PRESENT
TOTAL SCORE: 3
NAUSEA AND VOMITING: NO NAUSEA AND NO VOMITING
AUDITORY DISTURBANCES: NOT PRESENT
AGITATION: NORMAL ACTIVITY
ORIENTATION AND CLOUDING OF SENSORIUM: ORIENTED AND CAN DO SERIAL ADDITIONS
TREMOR: *
PAROXYSMAL SWEATS: NO SWEAT VISIBLE
AGITATION: NORMAL ACTIVITY
TREMOR: *
ORIENTATION AND CLOUDING OF SENSORIUM: ORIENTED AND CAN DO SERIAL ADDITIONS
TREMOR: *
TREMOR: *
HEADACHE, FULLNESS IN HEAD: NOT PRESENT
VISUAL DISTURBANCES: NOT PRESENT
AGITATION: NORMAL ACTIVITY
AGITATION: NORMAL ACTIVITY
TOTAL SCORE: 2
HEADACHE, FULLNESS IN HEAD: NOT PRESENT
PAROXYSMAL SWEATS: NO SWEAT VISIBLE

## 2017-09-13 ASSESSMENT — ENCOUNTER SYMPTOMS
NERVOUS/ANXIOUS: 1
MYALGIAS: 0
FLANK PAIN: 0
SHORTNESS OF BREATH: 0
HEADACHES: 0
WEAKNESS: 1
HEMOPTYSIS: 0
CHILLS: 0
BACK PAIN: 0
SPUTUM PRODUCTION: 1
ABDOMINAL PAIN: 0
FEVER: 0
FOCAL WEAKNESS: 0
COUGH: 1
HALLUCINATIONS: 0
VOMITING: 0
DIARRHEA: 0
TINGLING: 0

## 2017-09-13 ASSESSMENT — PAIN SCALES - GENERAL
PAINLEVEL_OUTOF10: 0
PAINLEVEL_OUTOF10: 0

## 2017-09-13 NOTE — DISCHARGE PLANNING
Contacted [facility] spoke to Francheska who spoke to ANU Oliva about PASRR needing to be a 1 not 2. Transport is on hold.

## 2017-09-13 NOTE — CARE PLAN
Problem: Discharge Barriers/Planning  Goal: Patient's continuum of care needs will be met  Outcome: PROGRESSING AS EXPECTED  Waiting for SNF placement, patient now has clothes to be discharged in.    Problem: Respiratory:  Goal: Respiratory status will improve  Outcome: PROGRESSING AS EXPECTED  Patient is no longer experiencing SOB associated with pneumonia. Lungs clear.

## 2017-09-13 NOTE — PROGRESS NOTES
No acute changes throughout shift, vitals stable, patient remained afebrile, safety maintained throughout shift

## 2017-09-13 NOTE — PROGRESS NOTES
[facility]    Date of Service: 2017    Chief Complaint  66 y.o. male admitted 2017 with sepsis due to pneumonia.  Interval Problem Update    Afeb on atbs. Improved congestion , dyspnea. Mild hypotension, asympt.     Consultants/Specialty  none    Disposition  snf        Review of Systems   Constitutional: Negative for chills and fever.   HENT: Positive for congestion.    Respiratory: Positive for cough and sputum production (mild). Negative for hemoptysis and shortness of breath.    Cardiovascular: Negative for chest pain.   Gastrointestinal: Negative for abdominal pain, diarrhea and vomiting.   Genitourinary: Negative for flank pain and hematuria.   Musculoskeletal: Negative for back pain and myalgias.   Neurological: Positive for weakness. Negative for focal weakness and headaches.   Psychiatric/Behavioral: Negative for hallucinations. The patient is nervous/anxious.       Physical Exam  Laboratory/Imaging   Hemodynamics  Temp (24hrs), Av.7 °C (98 °F), Min:36.5 °C (97.7 °F), Max:36.8 °C (98.2 °F)   Temperature: 36.7 °C (98.1 °F)  Pulse  Av.7  Min: 56  Max: 98    Blood Pressure : (!) 96/57 (Rn notified)      Respiratory      Respiration: 19, Pulse Oximetry: 92 %     Work Of Breathing / Effort: Mild  RUL Breath Sounds: Clear, RML Breath Sounds: Diminished, RLL Breath Sounds: Diminished, DEWAYNE Breath Sounds: Clear, LLL Breath Sounds: Diminished    Fluids    Intake/Output Summary (Last 24 hours) at 17 1219  Last data filed at 17 1100   Gross per 24 hour   Intake             1940 ml   Output             2800 ml   Net             -860 ml       Nutrition  Orders Placed This Encounter   Procedures   • Diet Order     Standing Status:   Standing     Number of Occurrences:   1     Order Specific Question:   Diet:     Answer:   Regular [1]     Physical Exam   Constitutional: He is oriented to person, place, and time. No distress.   HENT:   Head: Normocephalic and atraumatic.    Eyes: EOM are normal. No scleral icterus.   Neck: Normal range of motion. No JVD present.   Cardiovascular: Normal rate and regular rhythm.    No murmur heard.  Pulmonary/Chest: He has no wheezes. He has no rales.   Abdominal: Soft. Bowel sounds are normal. He exhibits no distension. There is no tenderness.   Musculoskeletal: He exhibits no edema or tenderness.   Lymphadenopathy:     He has no cervical adenopathy.   Neurological: He is alert and oriented to person, place, and time. No cranial nerve deficit. He exhibits normal muscle tone.   Skin: Skin is warm and dry. He is not diaphoretic. No erythema.   Psychiatric: He has a normal mood and affect. His behavior is normal.   Calm, cooperative.    Nursing note and vitals reviewed.      Recent Labs      09/11/17   0231  09/12/17   0421  09/13/17   0014   WBC  7.9  9.8  10.0   RBC  4.36*  4.31*  4.16*   HEMOGLOBIN  13.4*  13.3*  13.0*   HEMATOCRIT  40.2*  39.5*  38.8*   MCV  92.2  91.6  93.3   MCH  30.7  30.9  31.3   MCHC  33.3*  33.7  33.5*   RDW  43.0  41.7  43.8   PLATELETCT  186  189  202   MPV  12.1  11.4  11.7                          Assessment/Plan     * Sepsis (CMS-HCC)- (present on admission)   Assessment & Plan    This is sepsis (without associated acute organ dysfunction). - pneumonia source-  Resolved  Follow clinically   Transition to oral atbs.          Fever- (present on admission)   Assessment & Plan    attrib to PNA, resolved.   Prn tylenol         PNA (pneumonia)- (present on admission)   Assessment & Plan    CAP -- Improved, off o2  Clinically better  Switch to oral augmentin  azith 5 day course adequ, dc.        Transaminitis- (present on admission)   Assessment & Plan    From etoh  stable        Hypokalemia- (present on admission)   Assessment & Plan    Resolved.            Liver disease- (present on admission)   Assessment & Plan    From alcohol abuse.   Stable.          Alcohol withdrawal (CMS-HCC)- (present on admission)   Assessment & Plan     No signif symptoms  cw prn ativan per ciwa             Smoking- (present on admission)   Assessment & Plan    Smoking secession education provided  nicotine patch        Lactic acidosis- (present on admission)   Assessment & Plan    From sepsis, back to normal        Leukocytosis- (present on admission)   Assessment & Plan    From sepsis, pneumonia, resolved.             Reviewed items::  Labs reviewed, Medications reviewed and Radiology images reviewed  Pedersen catheter::  No Pedersen  DVT prophylaxis pharmacological::  Heparin  Antibiotics:  Treating active infection/contamination beyond 24 hours perioperative coverage      Debility - anticipate tx to snf - dw , multi disciplinary team.

## 2017-09-13 NOTE — CARE PLAN
Problem: Safety  Goal: Will remain free from injury  Outcome: PROGRESSING AS EXPECTED    Intervention: Provide assistance with mobility  Patient uses call light for assistance out of bed, is alert and oriented x4      Problem: Bowel/Gastric:  Goal: Normal bowel function is maintained or improved    Intervention: Educate patient and significant other/support system about diet, fluid intake, medications and activity to promote bowel function  Patient continent of bowel and bladder, uses urinal appropriately,

## 2017-09-13 NOTE — DISCHARGE PLANNING
Medical Social Work    [facility] does not have open bed today. SW met with pt and received choice for additional SNF's that are contracted with his secondary, [facility]. Faxed choice form to [facility].

## 2017-09-13 NOTE — DISCHARGE PLANNING
Received choice form from [facility]U [facility] for SNF. Referral sent to both [facility] and [facility] at [phone].

## 2017-09-13 NOTE — DISCHARGE PLANNING
Received transport form from [facility] to [facility]. Contacted [facility] left message for a call back.

## 2017-09-14 PROBLEM — R07.9 CHEST PAIN: Status: ACTIVE | Noted: 2017-09-14

## 2017-09-14 LAB
ANISOCYTOSIS BLD QL SMEAR: ABNORMAL
BASOPHILS # BLD AUTO: 0 % (ref 0–1.8)
BASOPHILS # BLD: 0 K/UL (ref 0–0.12)
EOSINOPHIL # BLD AUTO: 0.24 K/UL (ref 0–0.51)
EOSINOPHIL NFR BLD: 2.8 % (ref 0–6.9)
ERYTHROCYTE [DISTWIDTH] IN BLOOD BY AUTOMATED COUNT: 43.9 FL (ref 35.9–50)
HCT VFR BLD AUTO: 38.2 % (ref 42–52)
HGB BLD-MCNC: 12.8 G/DL (ref 14–18)
LYMPHOCYTES # BLD AUTO: 2.63 K/UL (ref 1–4.8)
LYMPHOCYTES NFR BLD: 30.6 % (ref 22–41)
MACROCYTES BLD QL SMEAR: ABNORMAL
MANUAL DIFF BLD: NORMAL
MCH RBC QN AUTO: 31.1 PG (ref 27–33)
MCHC RBC AUTO-ENTMCNC: 33.5 G/DL (ref 33.7–35.3)
MCV RBC AUTO: 92.9 FL (ref 81.4–97.8)
MONOCYTES # BLD AUTO: 1.2 K/UL (ref 0–0.85)
MONOCYTES NFR BLD AUTO: 13.9 % (ref 0–13.4)
MORPHOLOGY BLD-IMP: NORMAL
MYELOCYTES NFR BLD MANUAL: 0.9 %
NEUTROPHILS # BLD AUTO: 4.45 K/UL (ref 1.82–7.42)
NEUTROPHILS NFR BLD: 51.8 % (ref 44–72)
NRBC # BLD AUTO: 0 K/UL
NRBC BLD AUTO-RTO: 0 /100 WBC
PLATELET # BLD AUTO: 218 K/UL (ref 164–446)
PMV BLD AUTO: 11.2 FL (ref 9–12.9)
RBC # BLD AUTO: 4.11 M/UL (ref 4.7–6.1)
RBC BLD AUTO: PRESENT
TROPONIN I SERPL-MCNC: <0.01 NG/ML (ref 0–0.04)
TROPONIN I SERPL-MCNC: <0.01 NG/ML (ref 0–0.04)
WBC # BLD AUTO: 8.6 K/UL (ref 4.8–10.8)

## 2017-09-14 PROCEDURE — 85027 COMPLETE CBC AUTOMATED: CPT

## 2017-09-14 PROCEDURE — 770020 HCHG ROOM/CARE - TELE (206)

## 2017-09-14 PROCEDURE — 99232 SBSQ HOSP IP/OBS MODERATE 35: CPT | Performed by: HOSPITALIST

## 2017-09-14 PROCEDURE — 85007 BL SMEAR W/DIFF WBC COUNT: CPT

## 2017-09-14 PROCEDURE — A9270 NON-COVERED ITEM OR SERVICE: HCPCS | Performed by: INTERNAL MEDICINE

## 2017-09-14 PROCEDURE — 93005 ELECTROCARDIOGRAM TRACING: CPT | Performed by: HOSPITALIST

## 2017-09-14 PROCEDURE — 700102 HCHG RX REV CODE 250 W/ 637 OVERRIDE(OP): Performed by: INTERNAL MEDICINE

## 2017-09-14 PROCEDURE — 84484 ASSAY OF TROPONIN QUANT: CPT | Mod: 91

## 2017-09-14 PROCEDURE — 700102 HCHG RX REV CODE 250 W/ 637 OVERRIDE(OP): Performed by: HOSPITALIST

## 2017-09-14 PROCEDURE — 99232 SBSQ HOSP IP/OBS MODERATE 35: CPT | Performed by: INTERNAL MEDICINE

## 2017-09-14 PROCEDURE — 93010 ELECTROCARDIOGRAM REPORT: CPT | Performed by: INTERNAL MEDICINE

## 2017-09-14 PROCEDURE — A9270 NON-COVERED ITEM OR SERVICE: HCPCS | Performed by: HOSPITALIST

## 2017-09-14 PROCEDURE — 36415 COLL VENOUS BLD VENIPUNCTURE: CPT

## 2017-09-14 RX ADMIN — GUAIFENESIN AND DEXTROMETHORPHAN 5 ML: 100; 10 SYRUP ORAL at 05:03

## 2017-09-14 RX ADMIN — OXYCODONE HYDROCHLORIDE 5 MG: 5 TABLET ORAL at 14:45

## 2017-09-14 RX ADMIN — AMOXICILLIN AND CLAVULANATE POTASSIUM 1 TABLET: 875; 125 TABLET, FILM COATED ORAL at 20:43

## 2017-09-14 RX ADMIN — POTASSIUM CHLORIDE 20 MEQ: 1500 TABLET, EXTENDED RELEASE ORAL at 20:43

## 2017-09-14 RX ADMIN — STANDARDIZED SENNA CONCENTRATE AND DOCUSATE SODIUM 2 TABLET: 8.6; 5 TABLET, FILM COATED ORAL at 08:05

## 2017-09-14 RX ADMIN — LORAZEPAM 0.5 MG: 0.5 TABLET ORAL at 14:49

## 2017-09-14 RX ADMIN — POTASSIUM CHLORIDE 20 MEQ: 1500 TABLET, EXTENDED RELEASE ORAL at 08:05

## 2017-09-14 RX ADMIN — NICOTINE 14 MG: 14 PATCH, EXTENDED RELEASE TRANSDERMAL at 05:03

## 2017-09-14 RX ADMIN — GUAIFENESIN AND DEXTROMETHORPHAN 5 ML: 100; 10 SYRUP ORAL at 17:23

## 2017-09-14 RX ADMIN — AMOXICILLIN AND CLAVULANATE POTASSIUM 1 TABLET: 875; 125 TABLET, FILM COATED ORAL at 08:05

## 2017-09-14 RX ADMIN — GUAIFENESIN AND DEXTROMETHORPHAN 5 ML: 100; 10 SYRUP ORAL at 12:50

## 2017-09-14 ASSESSMENT — ENCOUNTER SYMPTOMS
HEMOPTYSIS: 0
NERVOUS/ANXIOUS: 1
SPUTUM PRODUCTION: 1
TREMORS: 0
STRIDOR: 0
BACK PAIN: 0
WEAKNESS: 1
FEVER: 0
COUGH: 1
SHORTNESS OF BREATH: 0
HALLUCINATIONS: 0
DIARRHEA: 0
ABDOMINAL PAIN: 0
FOCAL WEAKNESS: 0
CLAUDICATION: 0
HEADACHES: 0
MYALGIAS: 0
CHILLS: 0
FLANK PAIN: 0
VOMITING: 0

## 2017-09-14 ASSESSMENT — LIFESTYLE VARIABLES
AGITATION: NORMAL ACTIVITY
VISUAL DISTURBANCES: NOT PRESENT
PAROXYSMAL SWEATS: NO SWEAT VISIBLE
AGITATION: NORMAL ACTIVITY
TREMOR: TREMOR NOT VISIBLE BUT CAN BE FELT, FINGERTIP TO FINGERTIP
AUDITORY DISTURBANCES: NOT PRESENT
ANXIETY: MILDLY ANXIOUS
ORIENTATION AND CLOUDING OF SENSORIUM: ORIENTED AND CAN DO SERIAL ADDITIONS
VISUAL DISTURBANCES: NOT PRESENT
TOTAL SCORE: 0
ORIENTATION AND CLOUDING OF SENSORIUM: ORIENTED AND CAN DO SERIAL ADDITIONS
AUDITORY DISTURBANCES: NOT PRESENT
TOTAL SCORE: 2
ANXIETY: NO ANXIETY (AT EASE)
AGITATION: NORMAL ACTIVITY
NAUSEA AND VOMITING: NO NAUSEA AND NO VOMITING
TOTAL SCORE: 1
HEADACHE, FULLNESS IN HEAD: NOT PRESENT
NAUSEA AND VOMITING: NO NAUSEA AND NO VOMITING
ANXIETY: NO ANXIETY (AT EASE)
NAUSEA AND VOMITING: NO NAUSEA AND NO VOMITING
TREMOR: TREMOR NOT VISIBLE BUT CAN BE FELT, FINGERTIP TO FINGERTIP
NAUSEA AND VOMITING: NO NAUSEA AND NO VOMITING
PAROXYSMAL SWEATS: NO SWEAT VISIBLE
ANXIETY: MILDLY ANXIOUS
ORIENTATION AND CLOUDING OF SENSORIUM: ORIENTED AND CAN DO SERIAL ADDITIONS
AGITATION: NORMAL ACTIVITY
NAUSEA AND VOMITING: NO NAUSEA AND NO VOMITING
TREMOR: TREMOR NOT VISIBLE BUT CAN BE FELT, FINGERTIP TO FINGERTIP
VISUAL DISTURBANCES: NOT PRESENT
AUDITORY DISTURBANCES: NOT PRESENT
HEADACHE, FULLNESS IN HEAD: NOT PRESENT
ORIENTATION AND CLOUDING OF SENSORIUM: ORIENTED AND CAN DO SERIAL ADDITIONS
TOTAL SCORE: 2
TOTAL SCORE: 2
PAROXYSMAL SWEATS: NO SWEAT VISIBLE
VISUAL DISTURBANCES: NOT PRESENT
PAROXYSMAL SWEATS: NO SWEAT VISIBLE
AGITATION: NORMAL ACTIVITY
AUDITORY DISTURBANCES: NOT PRESENT
ORIENTATION AND CLOUDING OF SENSORIUM: ORIENTED AND CAN DO SERIAL ADDITIONS
ANXIETY: MILDLY ANXIOUS
AUDITORY DISTURBANCES: NOT PRESENT
HEADACHE, FULLNESS IN HEAD: NOT PRESENT
TREMOR: NO TREMOR
PAROXYSMAL SWEATS: NO SWEAT VISIBLE
VISUAL DISTURBANCES: NOT PRESENT
TREMOR: TREMOR NOT VISIBLE BUT CAN BE FELT, FINGERTIP TO FINGERTIP

## 2017-09-14 ASSESSMENT — PAIN SCALES - GENERAL
PAINLEVEL_OUTOF10: 0
PAINLEVEL_OUTOF10: 0

## 2017-09-14 NOTE — DISCHARGE PLANNING
Medical Social Work    Spoke with Renée [phone] with PAS[facility]. Per Renée, they are unable to transition pt from PASRR 2 to PASRR 1, unless hospital is able to provide documentation that pt has been stable for 2 years. Per chart review, pt's mental health disorders are not listed and pt is not currently on any psych meds. Renée states this is a concern, as previously when pt has been off of his medications, he is admitted to an inpt psych facility. Renée reports per her records, pt has had several inpt psych admissions. Renée states she would be uncomfortable changing pt's status due to the underlying mental illnesses pt has. Pt should have a PASRR coordinator, who would be the one coordinating specialized services for pt, as well as adjusting services, however coordinator pt previously had is in [place], [place]. Pt can be assigned a new coordinator once he is admitted to a nursing facility.     As of now, pt's PASRR status can't be changed. SW will inquire if local nursing facilities are able to accept pt with PASRR level 2B.

## 2017-09-14 NOTE — CARE PLAN
Problem: Knowledge Deficit  Goal: Knowledge of disease process/condition, treatment plan, diagnostic tests, and medications will improve  Outcome: PROGRESSING AS EXPECTED  Pt  educated on POC, all questions answered in regards to disease process, treatment and diet.     Problem: Mobility  Goal: Risk for activity intolerance will decrease  Outcome: PROGRESSING AS EXPECTED  Ambulates in room with standby assist. Tolerate well  Encouraged to continue to ambulate 2-3 times a day with staff..

## 2017-09-14 NOTE — PROGRESS NOTES
Bedside report from night shift nurse, assumed patient care.  Patient alert and oriented x4, informed of POC , verbalized understanding.  No complaints of pain at this time.  Safety precautions in place, bed in low locked position, call light within reach.

## 2017-09-14 NOTE — DISCHARGE PLANNING
Medical Social Work    Pt currently has a PASRR level 2 that was submitted in 2014. Pt has dx's of Schizoaffective and schizophrenia disorder. [facility] unable to accept pt with level 2 LOC and requested SW resubmit for level 1.     ANU called PAS[facility] who stated SW is unable to resubmit PASRR until pt is assessed by the state  for PASRR. Pt has one assigned but person who would have pt's assigned worker information is out of the office until next week.

## 2017-09-14 NOTE — PROGRESS NOTES
[facility]    Date of Service: 2017    Chief Complaint  66 y.o. male admitted 2017 with sepsis due to pneumonia.  Interval Problem Update    Afeb.  Improved congestion .  Notes intermittent chest pain- occurring w cough but also w/o  And during rest.     Consultants/Specialty  none    Disposition  snf        Review of Systems   Constitutional: Negative for chills and fever.   HENT: Positive for congestion.    Respiratory: Positive for cough and sputum production (mild). Negative for hemoptysis, shortness of breath and stridor.    Cardiovascular: Positive for chest pain. Negative for claudication and leg swelling.   Gastrointestinal: Negative for abdominal pain, diarrhea and vomiting.   Genitourinary: Negative for flank pain and hematuria.   Musculoskeletal: Negative for back pain and myalgias.   Neurological: Positive for weakness. Negative for tremors, focal weakness and headaches.   Psychiatric/Behavioral: Negative for hallucinations. The patient is nervous/anxious.       Physical Exam  Laboratory/Imaging   Hemodynamics  Temp (24hrs), Av.6 °C (97.8 °F), Min:36.3 °C (97.3 °F), Max:36.9 °C (98.4 °F)   Temperature: 36.9 °C (98.4 °F)  Pulse  Av.5  Min: 56  Max: 98    Blood Pressure : 104/63      Respiratory      Respiration: 19, Pulse Oximetry: 98 %     Work Of Breathing / Effort: Mild  RUL Breath Sounds: Clear, RML Breath Sounds: Diminished, RLL Breath Sounds: Diminished, DEWAYNE Breath Sounds: Clear, LLL Breath Sounds: Diminished    Fluids    Intake/Output Summary (Last 24 hours) at 17 1245  Last data filed at 17   Gross per 24 hour   Intake              360 ml   Output                0 ml   Net              360 ml       Nutrition  Orders Placed This Encounter   Procedures   • Diet Order     Standing Status:   Standing     Number of Occurrences:   1     Order Specific Question:   Diet:     Answer:   Regular [1]     Physical Exam   Constitutional: He is oriented to  person, place, and time. No distress.   HENT:   Head: Normocephalic and atraumatic.   Eyes: EOM are normal. No scleral icterus.   Neck: Normal range of motion. No JVD present.   Cardiovascular: Normal rate and regular rhythm.    No murmur heard.  Pulmonary/Chest: He has no wheezes. He has no rales. He exhibits no tenderness.   Abdominal: Soft. Bowel sounds are normal. He exhibits no distension. There is no tenderness.   Musculoskeletal: He exhibits no edema or tenderness.   Lymphadenopathy:     He has no cervical adenopathy.   Neurological: He is alert and oriented to person, place, and time. No cranial nerve deficit. He exhibits normal muscle tone.   Skin: Skin is warm and dry. He is not diaphoretic. No erythema.   Psychiatric: He has a normal mood and affect. His behavior is normal.   Calm, cooperative.    Nursing note and vitals reviewed.      Recent Labs      09/12/17   0421  09/13/17   0014  09/14/17   0228   WBC  9.8  10.0  8.6   RBC  4.31*  4.16*  4.11*   HEMOGLOBIN  13.3*  13.0*  12.8*   HEMATOCRIT  39.5*  38.8*  38.2*   MCV  91.6  93.3  92.9   MCH  30.9  31.3  31.1   MCHC  33.7  33.5*  33.5*   RDW  41.7  43.8  43.9   PLATELETCT  189  202  218   MPV  11.4  11.7  11.2                          Assessment/Plan     * Sepsis (CMS-HCC)- (present on admission)   Assessment & Plan    This is sepsis (without associated acute organ dysfunction). - pneumonia source-  Resolved- afeb, nl wbc  Follow clinically   oral atbs.          Chest pain   Assessment & Plan    Suspect msktal vs inflammatory  Not consistently occurring w cough - Cardiac dz risk factors- check ekg, trop, stress test.         Fever- (present on admission)   Assessment & Plan    attrib to PNA, resolved.   Prn tylenol         PNA (pneumonia)- (present on admission)   Assessment & Plan    CAP -- Improved, off o2-- completed Azith - Clinically better  oral augmentin- complete 10 day course.           Transaminitis- (present on admission)   Assessment &  Plan    From etoh  stable        Hypokalemia- (present on admission)   Assessment & Plan    Resolved.            Liver disease- (present on admission)   Assessment & Plan    From alcohol abuse.   Stable.          Alcohol withdrawal (CMS-HCC)- (present on admission)   Assessment & Plan    No signif symptoms  cw prn ativan per ciwa - monitor             Smoking- (present on admission)   Assessment & Plan    Smoking cessation  education provided, re emphasize.   nicotine patch        Lactic acidosis- (present on admission)   Assessment & Plan    From sepsis, back to normal        Leukocytosis- (present on admission)   Assessment & Plan    From sepsis, pneumonia, resolved.             Reviewed items::  Labs reviewed, Medications reviewed and Radiology images reviewed  Pedersen catheter::  No Pedersen  DVT prophylaxis pharmacological::  Heparin  Antibiotics:  Treating active infection/contamination beyond 24 hours perioperative coverage      Debility - anticipate tx to snf - dw , multi disciplinary team.

## 2017-09-14 NOTE — ASSESSMENT & PLAN NOTE
Suspect msktal vs inflammatory  Not consistently occurring w cough - Cardiac dz risk factors- check ekg, trop, stress test.

## 2017-09-15 ENCOUNTER — APPOINTMENT (OUTPATIENT)
Dept: RADIOLOGY | Facility: MEDICAL CENTER | Age: 67
DRG: 871 | End: 2017-09-15
Attending: HOSPITALIST
Payer: MEDICARE

## 2017-09-15 VITALS
OXYGEN SATURATION: 94 % | SYSTOLIC BLOOD PRESSURE: 107 MMHG | WEIGHT: 161.6 LBS | HEART RATE: 70 BPM | TEMPERATURE: 98.1 F | BODY MASS INDEX: 21.89 KG/M2 | HEIGHT: 72 IN | RESPIRATION RATE: 19 BRPM | DIASTOLIC BLOOD PRESSURE: 58 MMHG

## 2017-09-15 LAB
ANION GAP SERPL CALC-SCNC: 9 MMOL/L (ref 0–11.9)
ANISOCYTOSIS BLD QL SMEAR: ABNORMAL
BASOPHILS # BLD AUTO: 0.9 % (ref 0–1.8)
BASOPHILS # BLD: 0.07 K/UL (ref 0–0.12)
BUN SERPL-MCNC: 7 MG/DL (ref 8–22)
CALCIUM SERPL-MCNC: 9 MG/DL (ref 8.5–10.5)
CHLORIDE SERPL-SCNC: 107 MMOL/L (ref 96–112)
CO2 SERPL-SCNC: 24 MMOL/L (ref 20–33)
CREAT SERPL-MCNC: 0.75 MG/DL (ref 0.5–1.4)
EKG IMPRESSION: NORMAL
EOSINOPHIL # BLD AUTO: 0.13 K/UL (ref 0–0.51)
EOSINOPHIL NFR BLD: 1.7 % (ref 0–6.9)
ERYTHROCYTE [DISTWIDTH] IN BLOOD BY AUTOMATED COUNT: 44.7 FL (ref 35.9–50)
GFR SERPL CREATININE-BSD FRML MDRD: >60 ML/MIN/1.73 M 2
GLUCOSE SERPL-MCNC: 89 MG/DL (ref 65–99)
HCT VFR BLD AUTO: 40.9 % (ref 42–52)
HGB BLD-MCNC: 13.6 G/DL (ref 14–18)
LYMPHOCYTES # BLD AUTO: 2.86 K/UL (ref 1–4.8)
LYMPHOCYTES NFR BLD: 37.1 % (ref 22–41)
MACROCYTES BLD QL SMEAR: ABNORMAL
MANUAL DIFF BLD: NORMAL
MCH RBC QN AUTO: 31.2 PG (ref 27–33)
MCHC RBC AUTO-ENTMCNC: 33.3 G/DL (ref 33.7–35.3)
MCV RBC AUTO: 93.8 FL (ref 81.4–97.8)
MONOCYTES # BLD AUTO: 0.79 K/UL (ref 0–0.85)
MONOCYTES NFR BLD AUTO: 10.3 % (ref 0–13.4)
MORPHOLOGY BLD-IMP: NORMAL
NEUTROPHILS # BLD AUTO: 3.85 K/UL (ref 1.82–7.42)
NEUTROPHILS NFR BLD: 50 % (ref 44–72)
NRBC # BLD AUTO: 0 K/UL
NRBC BLD AUTO-RTO: 0 /100 WBC
OVALOCYTES BLD QL SMEAR: NORMAL
PLATELET # BLD AUTO: 240 K/UL (ref 164–446)
PLATELET BLD QL SMEAR: NORMAL
PMV BLD AUTO: 11 FL (ref 9–12.9)
POIKILOCYTOSIS BLD QL SMEAR: NORMAL
POTASSIUM SERPL-SCNC: 4.1 MMOL/L (ref 3.6–5.5)
RBC # BLD AUTO: 4.36 M/UL (ref 4.7–6.1)
RBC BLD AUTO: PRESENT
SODIUM SERPL-SCNC: 140 MMOL/L (ref 135–145)
WBC # BLD AUTO: 7.7 K/UL (ref 4.8–10.8)

## 2017-09-15 PROCEDURE — 85027 COMPLETE CBC AUTOMATED: CPT

## 2017-09-15 PROCEDURE — 80048 BASIC METABOLIC PNL TOTAL CA: CPT

## 2017-09-15 PROCEDURE — 36415 COLL VENOUS BLD VENIPUNCTURE: CPT

## 2017-09-15 PROCEDURE — 85007 BL SMEAR W/DIFF WBC COUNT: CPT

## 2017-09-15 PROCEDURE — 99239 HOSP IP/OBS DSCHRG MGMT >30: CPT | Performed by: HOSPITALIST

## 2017-09-15 RX ORDER — AMOXICILLIN AND CLAVULANATE POTASSIUM 875; 125 MG/1; MG/1
1 TABLET, FILM COATED ORAL 2 TIMES DAILY
Qty: 4 TAB | Refills: 0 | Status: SHIPPED | OUTPATIENT
Start: 2017-09-15 | End: 2017-09-17

## 2017-09-15 ASSESSMENT — LIFESTYLE VARIABLES
VISUAL DISTURBANCES: NOT PRESENT
AGITATION: NORMAL ACTIVITY
AUDITORY DISTURBANCES: NOT PRESENT
HEADACHE, FULLNESS IN HEAD: NOT PRESENT
HEADACHE, FULLNESS IN HEAD: NOT PRESENT
AGITATION: NORMAL ACTIVITY
PAROXYSMAL SWEATS: NO SWEAT VISIBLE
ORIENTATION AND CLOUDING OF SENSORIUM: ORIENTED AND CAN DO SERIAL ADDITIONS
ANXIETY: NO ANXIETY (AT EASE)
NAUSEA AND VOMITING: NO NAUSEA AND NO VOMITING
TREMOR: NO TREMOR
ORIENTATION AND CLOUDING OF SENSORIUM: ORIENTED AND CAN DO SERIAL ADDITIONS
AUDITORY DISTURBANCES: NOT PRESENT
AGITATION: SOMEWHAT MORE THAN NORMAL ACTIVITY
ANXIETY: NO ANXIETY (AT EASE)
TREMOR: NO TREMOR
TOTAL SCORE: 2
ORIENTATION AND CLOUDING OF SENSORIUM: ORIENTED AND CAN DO SERIAL ADDITIONS
TREMOR: NO TREMOR
ANXIETY: MILDLY ANXIOUS
HEADACHE, FULLNESS IN HEAD: NOT PRESENT
VISUAL DISTURBANCES: NOT PRESENT
PAROXYSMAL SWEATS: NO SWEAT VISIBLE
NAUSEA AND VOMITING: NO NAUSEA AND NO VOMITING
AUDITORY DISTURBANCES: NOT PRESENT
NAUSEA AND VOMITING: NO NAUSEA AND NO VOMITING
PAROXYSMAL SWEATS: NO SWEAT VISIBLE
TOTAL SCORE: 0
VISUAL DISTURBANCES: NOT PRESENT
TOTAL SCORE: 0
EVER_SMOKED: YES

## 2017-09-15 ASSESSMENT — PAIN SCALES - GENERAL: PAINLEVEL_OUTOF10: 0

## 2017-09-15 NOTE — DISCHARGE INSTRUCTIONS
Discharge Instructions    Discharged to other by bus with self. Discharged via walking, hospital escort: Refused.  Special equipment needed: Not Applicable    Be sure to schedule a follow-up appointment with your primary care doctor or any specialists as instructed.     Discharge Plan:   Diet Plan: Discussed (low sodium, low fat diet)  Activity Level: Discussed (as tolerated)  Smoking Cessation Offered: Patient Counseled  Confirmed Follow up Appointment: Patient to Call and Schedule Appointment  Confirmed Symptoms Management: Discussed  Influenza Vaccine Indication: Patient Refuses    I understand that a diet low in cholesterol, fat, and sodium is recommended for good health. Unless I have been given specific instructions below for another diet, I accept this instruction as my diet prescription.   Other diet: low sodium, low fat      Special Instructions: Sepsis, Adult  Sepsis is a serious infection of your blood or tissues that affects your whole body. The infection that causes sepsis may be bacterial, viral, fungal, or parasitic. Sepsis may be life threatening. Sepsis can cause your blood pressure to drop. This may result in shock. Shock causes your central nervous system and your organs to stop working correctly.   RISK FACTORS  Sepsis can happen in anyone, but it is more likely to happen in people who have weakened immune systems.  SIGNS AND SYMPTOMS   Symptoms of sepsis can include:  · Fever or low body temperature (hypothermia).  · Rapid breathing (hyperventilation).  · Chills.  · Rapid heartbeat (tachycardia).  · Confusion or light-headedness.  · Trouble breathing.  · Urinating much less than usual.  · Cool, clammy skin or red, flushed skin.  · Other problems with the heart, kidneys, or brain.  DIAGNOSIS   Your health care provider will likely do tests to look for an infection, to see if the infection has spread to your blood, and to see how serious your condition is. Tests can include:  · Blood tests,  including cultures of your blood.  · Cultures of other fluids from your body, such as:  ¨ Urine.  ¨ Pus from wounds.  ¨ Mucus coughed up from your lungs.  · Urine tests other than cultures.  · X-ray exams or other imaging tests.  TREATMENT   Treatment will begin with elimination of the source of infection. If your sepsis is likely caused by a bacterial or fungal infection, you will be given antibiotic or antifungal medicines.  You may also receive:  · Oxygen.  · Fluids through an IV tube.  · Medicines to increase your blood pressure.  · A machine to clean your blood (dialysis) if your kidneys fail.  · A machine to help you breathe if your lungs fail.  SEEK IMMEDIATE MEDICAL CARE IF:  You get an infection or develop any of the signs and symptoms of sepsis after surgery or a hospitalization.     This information is not intended to replace advice given to you by your health care provider. Make sure you discuss any questions you have with your health care provider.     Document Released: 09/15/2004 Document Revised: 05/03/2016 Document Reviewed: 08/25/2014  [employer] Interactive Patient Education ©2016 [employer]      · Is patient discharged on Warfarin / Coumadin?   No     · Is patient Post Blood Transfusion?  No    Depression / Suicide Risk    As you are discharged from this [facility] facility, it is important to learn how to keep safe from harming yourself.    Recognize the warning signs:  · Abrupt changes in personality, positive or negative- including increase in energy   · Giving away possessions  · Change in eating patterns- significant weight changes-  positive or negative  · Change in sleeping patterns- unable to sleep or sleeping all the time   · Unwillingness or inability to communicate  · Depression  · Unusual sadness, discouragement and loneliness  · Talk of wanting to die  · Neglect of personal appearance   · Rebelliousness- reckless behavior  · Withdrawal from people/activities they  love  · Confusion- inability to concentrate     If you or a loved one observes any of these behaviors or has concerns about self-harm, here's what you can do:  · Talk about it- your feelings and reasons for harming yourself  · Remove any means that you might use to hurt yourself (examples: pills, rope, extension cords, firearm)  · Get professional help from the community (Mental Health, Substance Abuse, psychological counseling)  · Do not be alone:Call your Safe Contact- someone whom you trust who will be there for you.  · Call your local CRISIS HOTLINE 077-0811 or 873-514-2199  · Call your local Children's Mobile Crisis Response Team [place] (404) 382-8005 or www.lifeIO  · Call the toll free National Suicide Prevention Hotlines   · National Suicide Prevention Lifeline 023-990-QLSF (6294)  · National [employer] Line Network 800-SUICIDE (682-6747)    Sepsis, Adult    Sepsis is a serious infection of your blood or tissues that affects your whole body. The infection that causes sepsis may be bacterial, viral, fungal, or parasitic. Sepsis may be life threatening. Sepsis can cause your blood pressure to drop. This may result in shock. Shock causes your central nervous system and your organs to stop working correctly.   RISK FACTORS  Sepsis can happen in anyone, but it is more likely to happen in people who have weakened immune systems.  SIGNS AND SYMPTOMS   Symptoms of sepsis can include:  · Fever or low body temperature (hypothermia).  · Rapid breathing (hyperventilation).  · Chills.  · Rapid heartbeat (tachycardia).  · Confusion or light-headedness.  · Trouble breathing.  · Urinating much less than usual.  · Cool, clammy skin or red, flushed skin.  · Other problems with the heart, kidneys, or brain.  DIAGNOSIS   Your health care provider will likely do tests to look for an infection, to see if the infection has spread to your blood, and to see how serious your condition is. Tests can include:  · Blood tests,  including cultures of your blood.  · Cultures of other fluids from your body, such as:  ¨ Urine.  ¨ Pus from wounds.  ¨ Mucus coughed up from your lungs.  · Urine tests other than cultures.  · X-ray exams or other imaging tests.  TREATMENT   Treatment will begin with elimination of the source of infection. If your sepsis is likely caused by a bacterial or fungal infection, you will be given antibiotic or antifungal medicines.  You may also receive:  · Oxygen.  · Fluids through an IV tube.  · Medicines to increase your blood pressure.  · A machine to clean your blood (dialysis) if your kidneys fail.  · A machine to help you breathe if your lungs fail.  SEEK IMMEDIATE MEDICAL CARE IF:  You get an infection or develop any of the signs and symptoms of sepsis after surgery or a hospitalization.     This information is not intended to replace advice given to you by your health care provider. Make sure you discuss any questions you have with your health care provider.     Document Released: 09/15/2004 Document Revised: 05/03/2016 Document Reviewed: 08/25/2014  [employer] Interactive Patient Education ©2016 [employer]  Pneumonia, Adult  Pneumonia is an infection of the lungs.   CAUSES  Pneumonia may be caused by bacteria or a virus. Usually, the infection is caused by breathing in droplets from an infected person's cough or sneeze.   SYMPTOMS   Symptoms of pneumonia include:  · Cough.  · Fever.  · Chest pain.  · Rapid breathing.  · Shortness of breath.  · Shaking chills.  · Mucus production.  DIAGNOSIS   If you have the common symptoms of pneumonia, often your health care provider will confirm the diagnosis with a chest X-ray. The X-ray will show an abnormality in the lung if you have pneumonia. Other tests may be done on your blood, urine, or mucus (sputum) to find the specific cause of your pneumonia. A blood gas test or pulse oximetry test may be needed to check how well your lungs are working.  TREATMENT   Your  treatment will depend on whether your pneumonia is caused by bacteria or a virus.   · Bacterial pneumonia is treated with antibiotic medicine.  · Pneumonia that is caused by the influenza virus may be treated with an antiviral medicine.  · Pneumonia that is caused by a virus other than influenza will not respond to antibiotic medicine. This type of pneumonia will have to run its course.   HOME CARE INSTRUCTIONS   · Cough suppressants may be used if you are losing too much rest from coughing at night. However, you should try to avoid taking cough suppresants. This is because coughing helps to remove mucus from your lungs.  · Sleep in a semi-upright position at night. Try sleeping in a reclining chair, or place a few pillows under your head.  · Try using a cold steam vaporizer or humidifier in your home or bedroom. This may help loosen your mucus.  · If you were prescribed an antibiotic medicine, finish all of it even if you start to feel better.  · If you were prescribed an expectorant, take it as directed by your health care provider. This medicine loosens the mucus so you can cough it up.  · Take medicines only as directed by your health care provider.  · Do not smoke. If you are a smoker and continue to smoke, your cough may last several weeks after your pneumonia has cleared.  · Get rest when you feel tired, or as needed.  PREVENTION  A pneumococcal shot (vaccine) is available to prevent a common bacterial cause of pneumonia. This is usually suggested for:  · People over 65 years old.  · People on chemotherapy.  · People with chronic lung problems, such as bronchitis or emphysema.  · People with immune system problems.  If you are over 65 years old or have a high risk condition, you may receive the pneumococcal vaccine if you have not received it before. In some countries, a routine influenza vaccine is also recommended. This vaccine can help prevent some cases of pneumonia. You may be offered the influenza  vaccine as part of your care.  If you are a smoker, it is time to quit in order to prevent pneumonia in the future. You may receive instructions on how to stop smoking. Your health care provider can provide medicines and counseling to help you quit.  SEEK MEDICAL CARE IF:  · You have a fever.  · You cannot control your cough with suppressants at night, and you keep losing sleep.  SEEK IMMEDIATE MEDICAL CARE IF:   · You have worsening shortness of breath.  · You have increased chest pain.  · Your sickness becomes worse, especially if you are an older adult or have a weakened immune system.  · You cough up blood.  · You have pain that is getting worse or is not controlled with medicines.  · Your symptoms are getting worse rather than better.     This information is not intended to replace advice given to you by your health care provider. Make sure you discuss any questions you have with your health care provider.     Document Released: 12/18/2006 Document Revised: 01/08/2016 Document Reviewed: 04/13/2016  [employer] Interactive Patient Education ©2016 [employer] Inc.

## 2017-09-15 NOTE — PROGRESS NOTES
Report received at bedside, assumed care. Pt is sitting up in bed eating dinner. A&O x 4. No complaints of chest pain at this time. Discussed with patient concerns regarding discharge and stress test tomorrow, all questions answered. No other concerns, complains or distress. Tele box on. Chart reviewed. Bed in lowest position, treaded slipper sock on, and call light within reach.

## 2017-09-15 NOTE — CARE PLAN
Problem: Discharge Barriers/Planning  Goal: Patient's continuum of care needs will be met  Outcome: PROGRESSING AS EXPECTED  Discuss plan of care with  and MD to establish safe DC plan for patient    Problem: Psychosocial Needs:  Goal: Level of anxiety will decrease  Outcome: PROGRESSING AS EXPECTED  Provide patient continuous updates to POC, answer questions quickly to reduce anxiety

## 2017-09-15 NOTE — CARE PLAN
Problem: Safety  Goal: Will remain free from falls    Intervention: Assess risk factors for falls  Educated patient on use of call light, no slip socks on, bed lowest position. All needs attended to. Patient verbalized understanding.         Problem: Psychosocial Needs:  Goal: Level of anxiety will decrease    Intervention: Identify and develop with patient strategies to cope with anxiety triggers  Listened to patients discussion of concerns r/t disease process and plan of care. Discussed with patient pain goal and management through relaxation techniques. Supported and educated patient by addressing specific questions regarding diagnosis, risks, benefits of treatment. Pt verbalized understanding but needs reinforcement regarding anxiety and plan of care. Will continue to develop coping strategies and educate pt when needed.

## 2017-09-15 NOTE — PROGRESS NOTES
Discharge instructions reviewed with patient, including medications. Patient states that his sister has wired him money, and he will be using a bus pass [place] go [place]. His plan is to return to [place], where his friends and family are. Discussed option to DC to SNF in [place], patient adamantly refuses. Patient's prescriptions returned. Ambulated out with personal cane.

## 2017-09-15 NOTE — PROGRESS NOTES
Report received from nightshift RN. Patient resting in bed, respirations even and unlabored. Denies pain. Patient adamantly refusing stress test, states he wants to leave today and does not want to have any more tests or procedures done. Pacing around room, repeatedly asking CNA if he can leave. Will update MD

## 2017-09-15 NOTE — DISCHARGE PLANNING
Medical Social Work    Pt anxious to DC. Met with pt at bedside to discuss barrier to SNF placement. Pt expressed understanding and stated PASRR was submitted during a time where he was under a significant amount of stress. Pt states isn't on any medications right now because he doesn't need them. SW informed pt although he appears stable, we aren't able to reverse PASRR.    Pt would like to DC today and states his sister will send him money to fly to [place]. Pt says he feels safe discharging and is able to care for himself.

## 2017-09-16 NOTE — DISCHARGE SUMMARY
Hospital Medicine Discharge Note     Admit Date:  9/7/2017       Discharge Date:   9/15/2017    Attending Physician:  Ubaldo Turner M.D.      Diagnoses (includes active and resolved):     Principal Problem:    Sepsis (CMS-HCC) POA: Yes, resolved  Active Problems:    PNA (pneumonia) POA: Yes, clinically improved    Fever POA: Yes, resolved    Chest pain POA: Unknown, likely musculoskeletal resolved    Liver disease POA: Yes    Hypokalemia POA: Yes    Transaminitis POA: Yes    Leukocytosis POA: Yes    Lactic acidosis POA: Yes, resolved    Smoking POA: Yes    Alcohol withdrawal (CMS-HCC) POA: Yes, resolved.   Hospital Summary (Brief Narrative):         66-year-old male, homeless, alcohol abuse was admitted with symptoms of cough, congestion and fatigue. Diagnosis was sepsis, pneumonia source.  Treated with IV antibiotics.  With placement on Ativan per [facility] protocols had no significant withdrawal.  Cultures negative.  Initially placement was attempted at nursing facility for rehab.  His function improved, and challenge for placement due to his prior psychiatric issues.  He was Afebrile,  normal white count ,  stable blood pressure , Patient is anxious to go home and was discharged.     Consultants:        None     Imaging/ Testing:      DX-CHEST-PORTABLE (1 VIEW)   Final Result         1. Airspace opacities in the right lower lung, likely pneumonia.            Procedures:          None    Discharge Medications:             Medication List      START taking these medications      Instructions   amoxicillin-clavulanate 875-125 MG Tabs  Commonly known as:  AUGMENTIN   Take 1 Tab by mouth 2 times a day for 2 days.  Dose:  1 Tab               Diet:          Stop alcohol use      Activity:   As tolerated      Code status:   Full code        Follow up appointment details :      .    Future Appointments  Date Time Provider Department Center   9/19/2017 4:00 PM CARE MANAGER KELLY FLETCHER   9/20/2017 3:00 PM Nancy Mazariegos M.D.  [facility]           Time spent on discharge day patient visit: 45 minutes    #################################################

## 2017-09-19 ENCOUNTER — PATIENT OUTREACH (OUTPATIENT)
Dept: HEALTH INFORMATION MANAGEMENT | Facility: OTHER | Age: 67
End: 2017-09-19

## 2017-09-19 NOTE — PROGRESS NOTES
09/19/2017 1602 - Discharge Outreach attempt -   09/29/2017 1628 - Discharge Outreach attempt -   09/19/2017 1641 - Discharge Outreach attempt -

## 2017-11-27 ENCOUNTER — RESOLUTE PROFESSIONAL BILLING HOSPITAL PROF FEE (OUTPATIENT)
Dept: HOSPITALIST | Facility: MEDICAL CENTER | Age: 67
End: 2017-11-27
Payer: MEDICARE

## 2017-11-27 ENCOUNTER — APPOINTMENT (OUTPATIENT)
Dept: RADIOLOGY | Facility: MEDICAL CENTER | Age: 67
End: 2017-11-27
Attending: EMERGENCY MEDICINE
Payer: MEDICARE

## 2017-11-27 ENCOUNTER — HOSPITAL ENCOUNTER (OUTPATIENT)
Facility: MEDICAL CENTER | Age: 67
End: 2017-11-29
Attending: EMERGENCY MEDICINE | Admitting: INTERNAL MEDICINE
Payer: MEDICARE

## 2017-11-27 ENCOUNTER — APPOINTMENT (OUTPATIENT)
Dept: RADIOLOGY | Facility: MEDICAL CENTER | Age: 67
End: 2017-11-27
Attending: INTERNAL MEDICINE
Payer: MEDICARE

## 2017-11-27 DIAGNOSIS — R00.1 SINUS BRADYCARDIA: ICD-10-CM

## 2017-11-27 DIAGNOSIS — R55 SYNCOPE, UNSPECIFIED SYNCOPE TYPE: ICD-10-CM

## 2017-11-27 PROBLEM — G62.9 PERIPHERAL NEUROPATHY: Status: ACTIVE | Noted: 2017-11-27

## 2017-11-27 PROBLEM — Z59.00 HOMELESSNESS: Status: ACTIVE | Noted: 2017-11-27

## 2017-11-27 PROBLEM — F10.10 ALCOHOL ABUSE: Status: ACTIVE | Noted: 2017-11-27

## 2017-11-27 PROBLEM — R73.9 HYPERGLYCEMIA: Status: ACTIVE | Noted: 2017-11-27

## 2017-11-27 LAB
ALBUMIN SERPL BCP-MCNC: 3.8 G/DL (ref 3.2–4.9)
ALBUMIN/GLOB SERPL: 1.2 G/DL
ALP SERPL-CCNC: 60 U/L (ref 30–99)
ALT SERPL-CCNC: 26 U/L (ref 2–50)
ANION GAP SERPL CALC-SCNC: 8 MMOL/L (ref 0–11.9)
APTT PPP: 24.2 SEC (ref 24.7–36)
AST SERPL-CCNC: 26 U/L (ref 12–45)
BASOPHILS # BLD AUTO: 0.8 % (ref 0–1.8)
BASOPHILS # BLD: 0.06 K/UL (ref 0–0.12)
BILIRUB SERPL-MCNC: 0.8 MG/DL (ref 0.1–1.5)
BNP SERPL-MCNC: 16 PG/ML (ref 0–100)
BUN SERPL-MCNC: 14 MG/DL (ref 8–22)
CALCIUM SERPL-MCNC: 9.1 MG/DL (ref 8.5–10.5)
CHLORIDE SERPL-SCNC: 104 MMOL/L (ref 96–112)
CO2 SERPL-SCNC: 25 MMOL/L (ref 20–33)
CREAT SERPL-MCNC: 1.13 MG/DL (ref 0.5–1.4)
EKG IMPRESSION: NORMAL
EOSINOPHIL # BLD AUTO: 0.12 K/UL (ref 0–0.51)
EOSINOPHIL NFR BLD: 1.6 % (ref 0–6.9)
ERYTHROCYTE [DISTWIDTH] IN BLOOD BY AUTOMATED COUNT: 43.8 FL (ref 35.9–50)
EST. AVERAGE GLUCOSE BLD GHB EST-MCNC: 111 MG/DL
FOLATE SERPL-MCNC: 22.4 NG/ML
GFR SERPL CREATININE-BSD FRML MDRD: >60 ML/MIN/1.73 M 2
GLOBULIN SER CALC-MCNC: 3.1 G/DL (ref 1.9–3.5)
GLUCOSE SERPL-MCNC: 128 MG/DL (ref 65–99)
HBA1C MFR BLD: 5.5 % (ref 0–5.6)
HCT VFR BLD AUTO: 48.9 % (ref 42–52)
HGB BLD-MCNC: 16 G/DL (ref 14–18)
IMM GRANULOCYTES # BLD AUTO: 0.02 K/UL (ref 0–0.11)
IMM GRANULOCYTES NFR BLD AUTO: 0.3 % (ref 0–0.9)
INR PPP: 1.14 (ref 0.87–1.13)
LYMPHOCYTES # BLD AUTO: 1.92 K/UL (ref 1–4.8)
LYMPHOCYTES NFR BLD: 24.9 % (ref 22–41)
MCH RBC QN AUTO: 31 PG (ref 27–33)
MCHC RBC AUTO-ENTMCNC: 32.7 G/DL (ref 33.7–35.3)
MCV RBC AUTO: 94.8 FL (ref 81.4–97.8)
MONOCYTES # BLD AUTO: 0.75 K/UL (ref 0–0.85)
MONOCYTES NFR BLD AUTO: 9.7 % (ref 0–13.4)
NEUTROPHILS # BLD AUTO: 4.84 K/UL (ref 1.82–7.42)
NEUTROPHILS NFR BLD: 62.7 % (ref 44–72)
NRBC # BLD AUTO: 0 K/UL
NRBC BLD AUTO-RTO: 0 /100 WBC
PLATELET # BLD AUTO: 163 K/UL (ref 164–446)
PMV BLD AUTO: 11.9 FL (ref 9–12.9)
POTASSIUM SERPL-SCNC: 4.3 MMOL/L (ref 3.6–5.5)
PROT SERPL-MCNC: 6.9 G/DL (ref 6–8.2)
PROTHROMBIN TIME: 14.3 SEC (ref 12–14.6)
RBC # BLD AUTO: 5.16 M/UL (ref 4.7–6.1)
SODIUM SERPL-SCNC: 137 MMOL/L (ref 135–145)
TROPONIN I SERPL-MCNC: <0.01 NG/ML (ref 0–0.04)
TSH SERPL DL<=0.005 MIU/L-ACNC: 1.25 UIU/ML (ref 0.3–3.7)
VIT B12 SERPL-MCNC: 388 PG/ML (ref 211–911)
WBC # BLD AUTO: 7.7 K/UL (ref 4.8–10.8)

## 2017-11-27 PROCEDURE — 71010 DX-CHEST-PORTABLE (1 VIEW): CPT

## 2017-11-27 PROCEDURE — 72100 X-RAY EXAM L-S SPINE 2/3 VWS: CPT

## 2017-11-27 PROCEDURE — 96360 HYDRATION IV INFUSION INIT: CPT

## 2017-11-27 PROCEDURE — 70450 CT HEAD/BRAIN W/O DYE: CPT

## 2017-11-27 PROCEDURE — 82607 VITAMIN B-12: CPT

## 2017-11-27 PROCEDURE — 80053 COMPREHEN METABOLIC PANEL: CPT

## 2017-11-27 PROCEDURE — G0378 HOSPITAL OBSERVATION PER HR: HCPCS

## 2017-11-27 PROCEDURE — 82746 ASSAY OF FOLIC ACID SERUM: CPT

## 2017-11-27 PROCEDURE — A9270 NON-COVERED ITEM OR SERVICE: HCPCS | Performed by: INTERNAL MEDICINE

## 2017-11-27 PROCEDURE — 99285 EMERGENCY DEPT VISIT HI MDM: CPT

## 2017-11-27 PROCEDURE — 84484 ASSAY OF TROPONIN QUANT: CPT

## 2017-11-27 PROCEDURE — 93005 ELECTROCARDIOGRAM TRACING: CPT

## 2017-11-27 PROCEDURE — 85025 COMPLETE CBC W/AUTO DIFF WBC: CPT

## 2017-11-27 PROCEDURE — 93005 ELECTROCARDIOGRAM TRACING: CPT | Performed by: EMERGENCY MEDICINE

## 2017-11-27 PROCEDURE — 72070 X-RAY EXAM THORAC SPINE 2VWS: CPT

## 2017-11-27 PROCEDURE — 85730 THROMBOPLASTIN TIME PARTIAL: CPT

## 2017-11-27 PROCEDURE — 83880 ASSAY OF NATRIURETIC PEPTIDE: CPT

## 2017-11-27 PROCEDURE — 83036 HEMOGLOBIN GLYCOSYLATED A1C: CPT

## 2017-11-27 PROCEDURE — 94760 N-INVAS EAR/PLS OXIMETRY 1: CPT

## 2017-11-27 PROCEDURE — 700102 HCHG RX REV CODE 250 W/ 637 OVERRIDE(OP): Performed by: INTERNAL MEDICINE

## 2017-11-27 PROCEDURE — 700111 HCHG RX REV CODE 636 W/ 250 OVERRIDE (IP): Performed by: INTERNAL MEDICINE

## 2017-11-27 PROCEDURE — 84443 ASSAY THYROID STIM HORMONE: CPT

## 2017-11-27 PROCEDURE — 700105 HCHG RX REV CODE 258: Performed by: INTERNAL MEDICINE

## 2017-11-27 PROCEDURE — 99220 PR INITIAL OBSERVATION CARE,LEVL III: CPT | Performed by: INTERNAL MEDICINE

## 2017-11-27 PROCEDURE — 700105 HCHG RX REV CODE 258: Performed by: EMERGENCY MEDICINE

## 2017-11-27 PROCEDURE — 85610 PROTHROMBIN TIME: CPT

## 2017-11-27 PROCEDURE — 36415 COLL VENOUS BLD VENIPUNCTURE: CPT

## 2017-11-27 RX ORDER — POLYETHYLENE GLYCOL 3350 17 G/17G
1 POWDER, FOR SOLUTION ORAL
Status: DISCONTINUED | OUTPATIENT
Start: 2017-11-27 | End: 2017-11-29 | Stop reason: HOSPADM

## 2017-11-27 RX ORDER — NAPROXEN 500 MG/1
500 TABLET ORAL
Status: ON HOLD | COMMUNITY
End: 2017-11-29

## 2017-11-27 RX ORDER — HEPARIN SODIUM 5000 [USP'U]/ML
5000 INJECTION, SOLUTION INTRAVENOUS; SUBCUTANEOUS EVERY 8 HOURS
Status: DISCONTINUED | OUTPATIENT
Start: 2017-11-27 | End: 2017-11-29 | Stop reason: HOSPADM

## 2017-11-27 RX ORDER — THIAMINE MONONITRATE (VIT B1) 100 MG
100 TABLET ORAL DAILY
Status: DISCONTINUED | OUTPATIENT
Start: 2017-11-27 | End: 2017-11-29 | Stop reason: HOSPADM

## 2017-11-27 RX ORDER — ONDANSETRON 4 MG/1
4 TABLET, ORALLY DISINTEGRATING ORAL EVERY 4 HOURS PRN
Status: DISCONTINUED | OUTPATIENT
Start: 2017-11-27 | End: 2017-11-29 | Stop reason: HOSPADM

## 2017-11-27 RX ORDER — AMOXICILLIN 250 MG
2 CAPSULE ORAL 2 TIMES DAILY
Status: DISCONTINUED | OUTPATIENT
Start: 2017-11-27 | End: 2017-11-29 | Stop reason: HOSPADM

## 2017-11-27 RX ORDER — SODIUM CHLORIDE 9 MG/ML
INJECTION, SOLUTION INTRAVENOUS CONTINUOUS
Status: DISPENSED | OUTPATIENT
Start: 2017-11-27 | End: 2017-11-28

## 2017-11-27 RX ORDER — BISACODYL 10 MG
10 SUPPOSITORY, RECTAL RECTAL
Status: DISCONTINUED | OUTPATIENT
Start: 2017-11-27 | End: 2017-11-29 | Stop reason: HOSPADM

## 2017-11-27 RX ORDER — ONDANSETRON 2 MG/ML
4 INJECTION INTRAMUSCULAR; INTRAVENOUS EVERY 4 HOURS PRN
Status: DISCONTINUED | OUTPATIENT
Start: 2017-11-27 | End: 2017-11-29 | Stop reason: HOSPADM

## 2017-11-27 RX ORDER — NICOTINE 21 MG/24HR
14 PATCH, TRANSDERMAL 24 HOURS TRANSDERMAL
Status: DISCONTINUED | OUTPATIENT
Start: 2017-11-27 | End: 2017-11-29 | Stop reason: HOSPADM

## 2017-11-27 RX ORDER — ACETAMINOPHEN 325 MG/1
650 TABLET ORAL EVERY 6 HOURS PRN
Status: DISCONTINUED | OUTPATIENT
Start: 2017-11-27 | End: 2017-11-29 | Stop reason: HOSPADM

## 2017-11-27 RX ORDER — FOLIC ACID 1 MG/1
1 TABLET ORAL DAILY
Status: DISCONTINUED | OUTPATIENT
Start: 2017-11-27 | End: 2017-11-29 | Stop reason: HOSPADM

## 2017-11-27 RX ORDER — SODIUM CHLORIDE 9 MG/ML
1000 INJECTION, SOLUTION INTRAVENOUS ONCE
Status: COMPLETED | OUTPATIENT
Start: 2017-11-27 | End: 2017-11-27

## 2017-11-27 RX ADMIN — ACETAMINOPHEN 650 MG: 325 TABLET, FILM COATED ORAL at 21:08

## 2017-11-27 RX ADMIN — NICOTINE 14 MG: 14 PATCH, EXTENDED RELEASE TRANSDERMAL at 16:30

## 2017-11-27 RX ADMIN — STANDARDIZED SENNA CONCENTRATE AND DOCUSATE SODIUM 2 TABLET: 8.6; 5 TABLET, FILM COATED ORAL at 16:28

## 2017-11-27 RX ADMIN — Medication 100 MG: at 16:30

## 2017-11-27 RX ADMIN — SODIUM CHLORIDE 1000 ML: 9 INJECTION, SOLUTION INTRAVENOUS at 11:02

## 2017-11-27 RX ADMIN — ACETAMINOPHEN 650 MG: 325 TABLET, FILM COATED ORAL at 14:09

## 2017-11-27 RX ADMIN — THERA TABS 1 TABLET: TAB at 16:28

## 2017-11-27 RX ADMIN — FOLIC ACID 1 MG: 1 TABLET ORAL at 16:29

## 2017-11-27 RX ADMIN — SODIUM CHLORIDE: 9 INJECTION, SOLUTION INTRAVENOUS at 16:31

## 2017-11-27 RX ADMIN — HEPARIN SODIUM 5000 UNITS: 5000 INJECTION, SOLUTION INTRAVENOUS; SUBCUTANEOUS at 16:30

## 2017-11-27 ASSESSMENT — COGNITIVE AND FUNCTIONAL STATUS - GENERAL
DRESSING REGULAR UPPER BODY CLOTHING: A LITTLE
MOBILITY SCORE: 17
MOVING TO AND FROM BED TO CHAIR: A LITTLE
STANDING UP FROM CHAIR USING ARMS: A LITTLE
SUGGESTED CMS G CODE MODIFIER MOBILITY: CK
CLIMB 3 TO 5 STEPS WITH RAILING: A LOT
MOVING FROM LYING ON BACK TO SITTING ON SIDE OF FLAT BED: A LITTLE
DRESSING REGULAR LOWER BODY CLOTHING: A LITTLE
WALKING IN HOSPITAL ROOM: A LITTLE
TURNING FROM BACK TO SIDE WHILE IN FLAT BAD: A LITTLE
DAILY ACTIVITIY SCORE: 22
SUGGESTED CMS G CODE MODIFIER DAILY ACTIVITY: CJ

## 2017-11-27 ASSESSMENT — ENCOUNTER SYMPTOMS
WEAKNESS: 1
FOCAL WEAKNESS: 0
TINGLING: 0
BLURRED VISION: 0
DEPRESSION: 0
ABDOMINAL PAIN: 0
MYALGIAS: 0
DIZZINESS: 1
HEADACHES: 0
NECK PAIN: 0
LOSS OF CONSCIOUSNESS: 1
NAUSEA: 0
PALPITATIONS: 0
VOMITING: 0
HEMOPTYSIS: 0
BRUISES/BLEEDS EASILY: 0
FEVER: 0
CHILLS: 0
SHORTNESS OF BREATH: 0
SPUTUM PRODUCTION: 0

## 2017-11-27 ASSESSMENT — LIFESTYLE VARIABLES
EVER FELT BAD OR GUILTY ABOUT YOUR DRINKING: NO
AVERAGE NUMBER OF DAYS PER WEEK YOU HAVE A DRINK CONTAINING ALCOHOL: 3
TOTAL SCORE: 2
DO YOU DRINK ALCOHOL: YES
HAVE PEOPLE ANNOYED YOU BY CRITICIZING YOUR DRINKING: YES
EVER HAD A DRINK FIRST THING IN THE MORNING TO STEADY YOUR NERVES TO GET RID OF A HANGOVER: NO
CONSUMPTION TOTAL: POSITIVE
EVER_SMOKED: YES
HAVE YOU EVER FELT YOU SHOULD CUT DOWN ON YOUR DRINKING: YES
DOES PATIENT WANT TO STOP DRINKING: NO
TOTAL SCORE: 2
HOW MANY TIMES IN THE PAST YEAR HAVE YOU HAD 5 OR MORE DRINKS IN A DAY: 100
TOTAL SCORE: 2
EVER_SMOKED: YES
ON A TYPICAL DAY WHEN YOU DRINK ALCOHOL HOW MANY DRINKS DO YOU HAVE: 2

## 2017-11-27 ASSESSMENT — PAIN SCALES - GENERAL
PAINLEVEL_OUTOF10: 0
PAINLEVEL_OUTOF10: 2
PAINLEVEL_OUTOF10: 0

## 2017-11-27 ASSESSMENT — PATIENT HEALTH QUESTIONNAIRE - PHQ9
2. FEELING DOWN, DEPRESSED, IRRITABLE, OR HOPELESS: NOT AT ALL
SUM OF ALL RESPONSES TO PHQ9 QUESTIONS 1 AND 2: 0
SUM OF ALL RESPONSES TO PHQ QUESTIONS 1-9: 0
1. LITTLE INTEREST OR PLEASURE IN DOING THINGS: NOT AT ALL

## 2017-11-27 ASSESSMENT — COPD QUESTIONNAIRES
HAVE YOU SMOKED AT LEAST 100 CIGARETTES IN YOUR ENTIRE LIFE: YES
DO YOU EVER COUGH UP ANY MUCUS OR PHLEGM?: YES, A FEW DAYS A WEEK OR MONTH
COPD SCREENING SCORE: 5
DURING THE PAST 4 WEEKS HOW MUCH DID YOU FEEL SHORT OF BREATH: NONE/LITTLE OF THE TIME

## 2017-11-27 NOTE — ED NOTES
Med Rec completed per patient  Allergies reviewed  No ORAL antibiotics in last 30 days    Patient states he take no medications

## 2017-11-27 NOTE — ED NOTES
"68 y/o male bib ambulance after a reported syncopal episode this morning while at breakfast. Pt states he has had intermittent dizziness for \"a couple of months\", he usually sits down and \"it passes\", today he states he felt like he was going to pass out prior to the syncopal episode. Pt denies pain, sob, n/v.  "

## 2017-11-27 NOTE — ED PROVIDER NOTES
ED Provider Note    CHIEF COMPLAINT  Chief Complaint   Patient presents with   • Dizziness   • Syncope       HPI  Chepe Persaud is a 67 y.o. male who presents For evaluation of dizziness and syncope.  The patient states he was standing in line when he suddenly felt dizzy, feeling as if things were spinning around, and then he passed out.  He states he struck the left side of his forehead he fell.  The patient was brought in by EMS for evaluation.  The patient denies: Fever, URI symptoms, cough, sputum, hemoptysis, nausea, vomiting, hematemesis, melena, hematochezia, hematuria or dysuria, unilateral motor weakness or paresthesias.  No other acute symptomatology or complaints.    REVIEW OF SYSTEMS  See HPI for further details. All other systems negative.    PAST MEDICAL HISTORY  Recent hospitalization discharge summary in September showed:  Diagnoses (includes active and resolved):      Principal Problem:    Sepsis (CMS-HCC) POA: Yes, resolved  Active Problems:    PNA (pneumonia) POA: Yes, clinically improved    Fever POA: Yes, resolved    Chest pain POA: Unknown, likely musculoskeletal resolved    Liver disease POA: Yes    Hypokalemia POA: Yes    Transaminitis POA: Yes    Leukocytosis POA: Yes    Lactic acidosis POA: Yes, resolved    Smoking POA: Yes    Alcohol withdrawal (CMS-HCC) POA: Yes, resolved.     Hospital Summary (Brief Narrative):        66-year-old male, homeless, alcohol abuse was admitted with symptoms of cough, congestion and fatigue. Diagnosis was sepsis, pneumonia source.  Treated with IV antibiotics.  With placement on Ativan per [facility] protocols had no significant withdrawal.  Cultures negative.  Initially placement was attempted at nursing facility for rehab.  His function improved, and challenge for placement due to his prior psychiatric issues.  He was Afebrile,  normal white count ,  stable blood pressure , Patient is anxious to go home and was discharged.     FAMILY HISTORY  History  reviewed. No pertinent family history.    SOCIAL HISTORY  Positive tobacco use; positive alcohol use;    SURGICAL HISTORY  Past Surgical History:   Procedure Laterality Date   • PATELLA ORIF  1/17/2009    Performed by COMPA MURILLO at SURGERY [facility] ORS       CURRENT MEDICATIONS  See nurses notes    ALLERGIES  Allergies   Allergen Reactions   • Codeine Hives       PHYSICAL EXAM  VITAL SIGNS: /68   Pulse (!) 49   Resp 18   Ht 1.829 m (6')   Wt 60.3 kg (132 lb 15 oz)   BMI 18.03 kg/m²    Constitutional: 67-year-old black male, awake, oriented ×3  HENT: , Mild tenderness to the left forehead and supraorbital area, Bilateral external ears normal, tympanic membranes clear, Oropharynx mildly dry, No oral exudates, Nose normal.   Eyes: PERRL, EOMI, Conjunctiva normal, No discharge.   Neck: Normal range of motion, No tenderness, Supple, No stridor.   Lymphatic: No lymphadenopathy noted.   Cardiovascular: Normal heart rate, Normal rhythm, No murmurs, No rubs, No gallops.   Thorax & Lungs: Normal Equal breath sounds, No respiratory distress, No wheezing, no stridor, no rales. No chest tenderness.   Abdomen: Soft, nontender, nondistended, no organomegaly, positive bowel sounds normal in quality. No guarding or rebound.  Skin: Good skin turgor, pink, warm, dry. No rashes, petechiae, purpura. Normal capillary refill.   Back: No tenderness, No CVA tenderness.   Extremities: Intact distal pulses, No edema, No tenderness, No cyanosis, No clubbing. Vascular: Pulses are 2+, symmetric in the upper and lower extremities.  Musculoskeletal: Mild diffuse arthritic changes. No tenderness to palpation or major deformities noted.   Neurologic: Alert & oriented x 3, Normal motor function, Normal sensory function, No gross focal deficits noted.   Psychiatric: Affect normal, Judgment normal, Mood normal.       EKG  I have interpreted: Rate 50, rhythm sinus bradycardia, axis normal, P-R QRS Q-T intervals normal, nonspecific Q  waves V1-V2, no acute STEMI, 12-lead EKG,    RADIOLOGY/PROCEDURES  DX-CHEST-PORTABLE (1 VIEW)   Final Result      1.  Negative single view of the chest.      2.  Resolution of right lower lobe pneumonia      CT-HEAD W/O   Final Result      1.  No evidence of acute intracranial process.      2.  Periventricular chronic small vessel ischemic change.          COURSE & MEDICAL DECISION MAKING  Pertinent Labs & Imaging studies reviewed. (See chart for details)  1.  IV normal saline    Laboratory studies: CBC and differential within normal; CMP showed a random glucose 128 otherwise within normal; troponin less than 0.01;    Discussion/consultation: At this time, the patient presents for evaluation of dizziness which appears to be syncopal in nature.  Patient does have associated bradycardia but no significant hypotension.  At this time, I spoke with the hospitalist on call.  The patient will be admitted for further monitoring, treatment, and care.    FINAL IMPRESSION  1. Syncope, unspecified syncope type    2. Sinus bradycardia         PLAN  1.  The patient will be admitted for further monitoring, treatment, and care.    Electronically signed by: Guy G Gansert, 11/27/2017 10:36 AM

## 2017-11-28 LAB
ANION GAP SERPL CALC-SCNC: 6 MMOL/L (ref 0–11.9)
BASOPHILS # BLD AUTO: 0.6 % (ref 0–1.8)
BASOPHILS # BLD: 0.05 K/UL (ref 0–0.12)
BUN SERPL-MCNC: 13 MG/DL (ref 8–22)
CALCIUM SERPL-MCNC: 8.6 MG/DL (ref 8.5–10.5)
CHLORIDE SERPL-SCNC: 110 MMOL/L (ref 96–112)
CHOLEST SERPL-MCNC: 140 MG/DL (ref 100–199)
CO2 SERPL-SCNC: 22 MMOL/L (ref 20–33)
CREAT SERPL-MCNC: 0.83 MG/DL (ref 0.5–1.4)
EOSINOPHIL # BLD AUTO: 0.29 K/UL (ref 0–0.51)
EOSINOPHIL NFR BLD: 3.4 % (ref 0–6.9)
ERYTHROCYTE [DISTWIDTH] IN BLOOD BY AUTOMATED COUNT: 43.3 FL (ref 35.9–50)
GFR SERPL CREATININE-BSD FRML MDRD: >60 ML/MIN/1.73 M 2
GLUCOSE SERPL-MCNC: 94 MG/DL (ref 65–99)
HCT VFR BLD AUTO: 45 % (ref 42–52)
HDLC SERPL-MCNC: 44 MG/DL
HGB BLD-MCNC: 14.6 G/DL (ref 14–18)
IMM GRANULOCYTES # BLD AUTO: 0.02 K/UL (ref 0–0.11)
IMM GRANULOCYTES NFR BLD AUTO: 0.2 % (ref 0–0.9)
LDLC SERPL CALC-MCNC: 77 MG/DL
LV EJECT FRACT  99904: 60
LV EJECT FRACT MOD 2C 99903: 61.53
LV EJECT FRACT MOD 4C 99902: 70.54
LV EJECT FRACT MOD BP 99901: 63.35
LYMPHOCYTES # BLD AUTO: 2.59 K/UL (ref 1–4.8)
LYMPHOCYTES NFR BLD: 30.4 % (ref 22–41)
MAGNESIUM SERPL-MCNC: 1.9 MG/DL (ref 1.5–2.5)
MCH RBC QN AUTO: 30.5 PG (ref 27–33)
MCHC RBC AUTO-ENTMCNC: 32.4 G/DL (ref 33.7–35.3)
MCV RBC AUTO: 93.9 FL (ref 81.4–97.8)
MONOCYTES # BLD AUTO: 0.92 K/UL (ref 0–0.85)
MONOCYTES NFR BLD AUTO: 10.8 % (ref 0–13.4)
NEUTROPHILS # BLD AUTO: 4.65 K/UL (ref 1.82–7.42)
NEUTROPHILS NFR BLD: 54.6 % (ref 44–72)
NRBC # BLD AUTO: 0 K/UL
NRBC BLD AUTO-RTO: 0 /100 WBC
PLATELET # BLD AUTO: 155 K/UL (ref 164–446)
PMV BLD AUTO: 11.9 FL (ref 9–12.9)
POTASSIUM SERPL-SCNC: 3.9 MMOL/L (ref 3.6–5.5)
RBC # BLD AUTO: 4.79 M/UL (ref 4.7–6.1)
SODIUM SERPL-SCNC: 138 MMOL/L (ref 135–145)
TRIGL SERPL-MCNC: 94 MG/DL (ref 0–149)
WBC # BLD AUTO: 8.5 K/UL (ref 4.8–10.8)

## 2017-11-28 PROCEDURE — G0378 HOSPITAL OBSERVATION PER HR: HCPCS

## 2017-11-28 PROCEDURE — 700102 HCHG RX REV CODE 250 W/ 637 OVERRIDE(OP): Performed by: HOSPITALIST

## 2017-11-28 PROCEDURE — 93880 EXTRACRANIAL BILAT STUDY: CPT

## 2017-11-28 PROCEDURE — 80061 LIPID PANEL: CPT

## 2017-11-28 PROCEDURE — 700111 HCHG RX REV CODE 636 W/ 250 OVERRIDE (IP): Performed by: INTERNAL MEDICINE

## 2017-11-28 PROCEDURE — 97162 PT EVAL MOD COMPLEX 30 MIN: CPT

## 2017-11-28 PROCEDURE — G8987 SELF CARE CURRENT STATUS: HCPCS | Mod: CJ

## 2017-11-28 PROCEDURE — A9270 NON-COVERED ITEM OR SERVICE: HCPCS | Performed by: HOSPITALIST

## 2017-11-28 PROCEDURE — 83735 ASSAY OF MAGNESIUM: CPT

## 2017-11-28 PROCEDURE — 93306 TTE W/DOPPLER COMPLETE: CPT | Mod: 26 | Performed by: INTERNAL MEDICINE

## 2017-11-28 PROCEDURE — 700102 HCHG RX REV CODE 250 W/ 637 OVERRIDE(OP): Performed by: INTERNAL MEDICINE

## 2017-11-28 PROCEDURE — 700105 HCHG RX REV CODE 258: Performed by: INTERNAL MEDICINE

## 2017-11-28 PROCEDURE — 85025 COMPLETE CBC W/AUTO DIFF WBC: CPT

## 2017-11-28 PROCEDURE — 93306 TTE W/DOPPLER COMPLETE: CPT

## 2017-11-28 PROCEDURE — 36415 COLL VENOUS BLD VENIPUNCTURE: CPT

## 2017-11-28 PROCEDURE — 93880 EXTRACRANIAL BILAT STUDY: CPT | Mod: 26 | Performed by: SURGERY

## 2017-11-28 PROCEDURE — G8988 SELF CARE GOAL STATUS: HCPCS | Mod: CI

## 2017-11-28 PROCEDURE — G8979 MOBILITY GOAL STATUS: HCPCS | Mod: CI

## 2017-11-28 PROCEDURE — 97166 OT EVAL MOD COMPLEX 45 MIN: CPT

## 2017-11-28 PROCEDURE — 80048 BASIC METABOLIC PNL TOTAL CA: CPT

## 2017-11-28 PROCEDURE — A9270 NON-COVERED ITEM OR SERVICE: HCPCS | Performed by: INTERNAL MEDICINE

## 2017-11-28 PROCEDURE — 99226 PR SUBSEQUENT OBSERVATION CARE,LEVEL III: CPT | Performed by: HOSPITALIST

## 2017-11-28 PROCEDURE — G8978 MOBILITY CURRENT STATUS: HCPCS | Mod: CJ

## 2017-11-28 RX ORDER — KETOCONAZOLE 20 MG/G
CREAM TOPICAL DAILY
Status: DISCONTINUED | OUTPATIENT
Start: 2017-11-28 | End: 2017-11-29 | Stop reason: HOSPADM

## 2017-11-28 RX ADMIN — KETOCONAZOLE: 20 CREAM TOPICAL at 11:39

## 2017-11-28 RX ADMIN — ACETAMINOPHEN 650 MG: 325 TABLET, FILM COATED ORAL at 05:44

## 2017-11-28 RX ADMIN — Medication 100 MG: at 10:28

## 2017-11-28 RX ADMIN — HEPARIN SODIUM 5000 UNITS: 5000 INJECTION, SOLUTION INTRAVENOUS; SUBCUTANEOUS at 22:00

## 2017-11-28 RX ADMIN — FOLIC ACID 1 MG: 1 TABLET ORAL at 10:28

## 2017-11-28 RX ADMIN — SODIUM CHLORIDE: 9 INJECTION, SOLUTION INTRAVENOUS at 12:40

## 2017-11-28 RX ADMIN — THERA TABS 1 TABLET: TAB at 10:28

## 2017-11-28 RX ADMIN — NICOTINE 14 MG: 14 PATCH, EXTENDED RELEASE TRANSDERMAL at 05:44

## 2017-11-28 RX ADMIN — STANDARDIZED SENNA CONCENTRATE AND DOCUSATE SODIUM 2 TABLET: 8.6; 5 TABLET, FILM COATED ORAL at 22:00

## 2017-11-28 RX ADMIN — SODIUM CHLORIDE: 9 INJECTION, SOLUTION INTRAVENOUS at 02:21

## 2017-11-28 RX ADMIN — ACETAMINOPHEN 650 MG: 325 TABLET, FILM COATED ORAL at 11:39

## 2017-11-28 RX ADMIN — HEPARIN SODIUM 5000 UNITS: 5000 INJECTION, SOLUTION INTRAVENOUS; SUBCUTANEOUS at 05:43

## 2017-11-28 RX ADMIN — HEPARIN SODIUM 5000 UNITS: 5000 INJECTION, SOLUTION INTRAVENOUS; SUBCUTANEOUS at 15:08

## 2017-11-28 ASSESSMENT — COGNITIVE AND FUNCTIONAL STATUS - GENERAL
DRESSING REGULAR LOWER BODY CLOTHING: A LITTLE
TOILETING: A LITTLE
DAILY ACTIVITIY SCORE: 20
MOVING FROM LYING ON BACK TO SITTING ON SIDE OF FLAT BED: A LITTLE
MOVING TO AND FROM BED TO CHAIR: A LITTLE
HELP NEEDED FOR BATHING: A LITTLE
STANDING UP FROM CHAIR USING ARMS: A LITTLE
TURNING FROM BACK TO SIDE WHILE IN FLAT BAD: A LITTLE
SUGGESTED CMS G CODE MODIFIER MOBILITY: CK
WALKING IN HOSPITAL ROOM: A LITTLE
MOBILITY SCORE: 18
PERSONAL GROOMING: A LITTLE
CLIMB 3 TO 5 STEPS WITH RAILING: A LITTLE
SUGGESTED CMS G CODE MODIFIER DAILY ACTIVITY: CJ

## 2017-11-28 ASSESSMENT — GAIT ASSESSMENTS
GAIT LEVEL OF ASSIST: SUPERVISED
ASSISTIVE DEVICE: FRONT WHEEL WALKER
DISTANCE (FEET): 100

## 2017-11-28 ASSESSMENT — ENCOUNTER SYMPTOMS
BLOOD IN STOOL: 0
BLURRED VISION: 0
EYE PAIN: 0
DIZZINESS: 0
NERVOUS/ANXIOUS: 0
SPUTUM PRODUCTION: 0
SHORTNESS OF BREATH: 0
PHOTOPHOBIA: 0
PND: 0
TREMORS: 0
DEPRESSION: 0
VOMITING: 0
HEARTBURN: 0
SENSORY CHANGE: 0
WEAKNESS: 0
ORTHOPNEA: 0
MYALGIAS: 0
CONSTIPATION: 0
HEADACHES: 0
SPEECH CHANGE: 0
NECK PAIN: 0
DOUBLE VISION: 0
BACK PAIN: 0
FEVER: 0
PALPITATIONS: 0
TINGLING: 0
HEMOPTYSIS: 0
CLAUDICATION: 0
STRIDOR: 0
NAUSEA: 0
MEMORY LOSS: 0
COUGH: 0
CHILLS: 0
SORE THROAT: 0

## 2017-11-28 ASSESSMENT — ACTIVITIES OF DAILY LIVING (ADL): TOILETING: INDEPENDENT

## 2017-11-28 ASSESSMENT — PAIN SCALES - GENERAL
PAINLEVEL_OUTOF10: 0
PAINLEVEL_OUTOF10: 0
PAINLEVEL_OUTOF10: 5

## 2017-11-28 NOTE — ASSESSMENT & PLAN NOTE
Hx of previous episodes, vasovagal vs cardiogenic?  No arrhythmias overnight.  Check echocardiogram  Check carotid duplex.  PT/OT  TSH, WNL

## 2017-11-28 NOTE — PROGRESS NOTES
Assumed care at 1600, report received at bedside from ER RN. Patient is AOx4 with no signs of distress. Pt. Is sinus rhythm on the monitor. Initial assessment completed, orders reviewed, call light within reach, bed alarm in use, and hourly rounding in place. POC addressed with patient, no additional questions at this time.

## 2017-11-28 NOTE — H&P
HOSPITAL MEDICINE HISTORY/ PHYSICAL    Date of Service:  11/27/2017   7:29 PM       Patient ID:   Name: Chepe Persaud. YOB: 1950. Age: 67 y.o. male. MRN: [id number]    Admitting Attending:  Luc Michel     PCP : Pcp Pt States None          Chief Complaint:       Passing out and feeling weak    History of Present Illness:    Cori is a 67 y.o. male w/h/o alcohol abuse and homelessness who presents with above chief complaint. Patient states that he was doing okay and then this morning fellow but weak and was in the lying at the homeless shelter for food as he was hungry and felt a promontory symptoms including some flushing and then passed out. This been drinking 3-4 beers a day but no binging and he continues to smoke cigarettes. Denies any associated chest pain nausea vomiting fevers or chills. Denies any new skin rashes. He says his urination has been going down even though his been drinking a lot of fluids including 7-Up. Other than that he feels like his whole body is weak and denies any nausea or vomiting. Denies any diarrhea.    Review of Systems:    Has Review of Systems   Constitutional: Positive for malaise/fatigue. Negative for chills and fever.   HENT: Negative for hearing loss.    Eyes: Negative for blurred vision.   Respiratory: Negative for hemoptysis, sputum production and shortness of breath.    Cardiovascular: Negative for chest pain, palpitations and leg swelling.   Gastrointestinal: Negative for abdominal pain, nausea and vomiting.   Genitourinary: Negative for dysuria.   Musculoskeletal: Negative for myalgias and neck pain.   Skin: Negative for itching.   Neurological: Positive for dizziness, loss of consciousness and weakness. Negative for tingling, focal weakness and headaches.   Endo/Heme/Allergies: Does not bruise/bleed easily.   Psychiatric/Behavioral: Negative for depression.   All other systems reviewed and are negative.    Please see HPI, all other systems  were reviewed and are negative (AMA/CMS criteria)              Past Medical/ Family / Social history (PFSH):   Past Medical History:   Diagnosis Date   • Liver disease     HEP C+     Past Surgical History:   Procedure Laterality Date   • PATELLA ORIF  1/17/2009    Performed by COMPA MURILLO at [facility]     Current Outpatient Medications:  No current facility-administered medications on file prior to encounter.      No current outpatient prescriptions on file prior to encounter.     Medication Allergy/Sensitivities:  Allergies   Allergen Reactions   • Codeine Hives     Family History:  Family History   Problem Relation Age of Onset   • Cancer Mother    • Alcohol/Drug Father    • Cancer Brother       Social History:  Social History   Substance Use Topics   • Smoking status: Current Every Day Smoker     Packs/day: 0.50     Types: Cigarettes   • Smokeless tobacco: Never Used      Comment: pk/day   • Alcohol use Yes      Comment: a couple of beers/day     Counseled the patient on the importance of tobacco cessation including the use of chantix, wellbutrin, and hypnosis. Patient is in understanding of this issue. BILL CODE 05429.  No desire to quit    #################################################################  Physical Exam:   Vitals/ General Appearance:   Weight/BMI: Body mass index is 22.28 kg/m².  Blood pressure 112/72, pulse 66, temperature 36.8 °C (98.3 °F), resp. rate 18, height 1.829 m (6'), weight 74.5 kg (164 lb 3.9 oz), SpO2 95 %.   Vitals:    11/27/17 1430 11/27/17 1500 11/27/17 1528 11/27/17 1631   BP:   112/72    Pulse: (!) 59 60 68 66   Resp: 15 17 18 18   Temp:   36.8 °C (98.3 °F)    SpO2: 95% 94%  95%   Weight:   74.5 kg (164 lb 3.9 oz)    Height:        Oxygen Therapy:  Pulse Oximetry: 95 %, O2 (LPM): 0, O2 Delivery: None (Room Air)    Constitutional:  well developed, well nourished  HENMT: Normocephalic, atraumatic, b/l ears normal, nose normal  Eyes:  EOMI, conjunctiva normal,  no discharge  Neck: no tracheal deviation, supple  Cardiovascular: normal heart rate, normal rhythm, no murmurs, no rubs or gallops; no cyanosis, clubbing or edema  Lungs: Respiratory effort is normal, normal breath sounds, breath sounds clear to auscultation b/l, no rales, rhonchi or wheezing  Abdomen: soft, non-tender, no guarding or rebound  Skin: warm, dry, no erythema, no rash  Neurologic: Alert and oriented, strength 5 out of 5 throughout, no focal deficits, CN II-XII normal  Psychiatric: No anxiety or depression    #################################################################  Lab Data Review:    Objective   Recent Results (from the past 24 hour(s))   EKG (ER)    Collection Time: 17 10:17 AM   Result Value Ref Range    Report       [facility]    Test Date:  2017  Pt Name:    ROBBIE VORA             Department: ER  MRN:        [id number]                      Room:       [facility]  Gender:     M                            Technician: [id number]  :        1950                   Requested By:ER TRIAGE PROTOCOL  Order #:    [id number]                    Reading MD:    Measurements  Intervals                                Axis  Rate:       53                           P:          73  NV:         172                          QRS:        41  QRSD:       80                           T:          69  QT:         432  QTc:        406    Interpretive Statements  SINUS BRADYCARDIA  VENTRICULAR PREMATURE COMPLEX  CONSIDER ANTEROSEPTAL INFARCT  Compared to ECG 2017 14:12:51  Ventricular premature complex(es) now present  Myocardial infarct finding now present  Atrial premature complex(es) no longer present     CBC w/ Differential    Collection Time: 17 10:25 AM   Result Value Ref Range    WBC 7.7 4.8 - 10.8 K/uL    RBC 5.16 4.70 - 6.10 M/uL    Hemoglobin 16.0 14.0 - 18.0 g/dL    Hematocrit 48.9 42.0 - 52.0 %    MCV 94.8 81.4 - 97.8 fL    MCH 31.0 27.0 - 33.0  pg    MCHC 32.7 (L) 33.7 - 35.3 g/dL    RDW 43.8 35.9 - 50.0 fL    Platelet Count 163 (L) 164 - 446 K/uL    MPV 11.9 9.0 - 12.9 fL    Neutrophils-Polys 62.70 44.00 - 72.00 %    Lymphocytes 24.90 22.00 - 41.00 %    Monocytes 9.70 0.00 - 13.40 %    Eosinophils 1.60 0.00 - 6.90 %    Basophils 0.80 0.00 - 1.80 %    Immature Granulocytes 0.30 0.00 - 0.90 %    Nucleated RBC 0.00 /100 WBC    Neutrophils (Absolute) 4.84 1.82 - 7.42 K/uL    Lymphs (Absolute) 1.92 1.00 - 4.80 K/uL    Monos (Absolute) 0.75 0.00 - 0.85 K/uL    Eos (Absolute) 0.12 0.00 - 0.51 K/uL    Baso (Absolute) 0.06 0.00 - 0.12 K/uL    Immature Granulocytes (abs) 0.02 0.00 - 0.11 K/uL    NRBC (Absolute) 0.00 K/uL   Complete Metabolic Panel (CMP)    Collection Time: 11/27/17 10:25 AM   Result Value Ref Range    Sodium 137 135 - 145 mmol/L    Potassium 4.3 3.6 - 5.5 mmol/L    Chloride 104 96 - 112 mmol/L    Co2 25 20 - 33 mmol/L    Anion Gap 8.0 0.0 - 11.9    Glucose 128 (H) 65 - 99 mg/dL    Bun 14 8 - 22 mg/dL    Creatinine 1.13 0.50 - 1.40 mg/dL    Calcium 9.1 8.5 - 10.5 mg/dL    AST(SGOT) 26 12 - 45 U/L    ALT(SGPT) 26 2 - 50 U/L    Alkaline Phosphatase 60 30 - 99 U/L    Total Bilirubin 0.8 0.1 - 1.5 mg/dL    Albumin 3.8 3.2 - 4.9 g/dL    Total Protein 6.9 6.0 - 8.2 g/dL    Globulin 3.1 1.9 - 3.5 g/dL    A-G Ratio 1.2 g/dL   Btype Natriuretic Peptide    Collection Time: 11/27/17 10:25 AM   Result Value Ref Range    B Natriuretic Peptide 16 0 - 100 pg/mL   Troponin STAT    Collection Time: 11/27/17 10:25 AM   Result Value Ref Range    Troponin I <0.01 0.00 - 0.04 ng/mL   APTT    Collection Time: 11/27/17 10:25 AM   Result Value Ref Range    APTT 24.2 (L) 24.7 - 36.0 sec   PT/INR    Collection Time: 11/27/17 10:25 AM   Result Value Ref Range    PT 14.3 12.0 - 14.6 sec    INR 1.14 (H) 0.87 - 1.13   ESTIMATED GFR    Collection Time: 11/27/17 10:25 AM   Result Value Ref Range    GFR If African American >60 >60 mL/min/1.73 m 2    GFR If Non African American >60  >60 mL/min/1.73 m 2   Hemoglobin A1c    Collection Time: 11/27/17 10:25 AM   Result Value Ref Range    Glycohemoglobin 5.5 0.0 - 5.6 %    Est Avg Glucose 111 mg/dL   TSH (Thyroid Stimulating Hormone)    Collection Time: 11/27/17 10:25 AM   Result Value Ref Range    TSH 1.250 0.300 - 3.700 uIU/mL   VITAMIN B12    Collection Time: 11/27/17 10:25 AM   Result Value Ref Range    Vitamin B12 -True Cobalamin 388 211 - 911 pg/mL   FOLATE    Collection Time: 11/27/17 10:25 AM   Result Value Ref Range    Folate -Folic Acid 22.4 >4.0 ng/mL         Imaging/Procedures Review:    DX-THORACIC SPINE-2 VIEWS   Final Result      1.  Mild degenerative change and S-shaped curvature of thoracic spine.   2.  No fracture or subluxation.      DX-LUMBAR SPINE-2 OR 3 VIEWS   Final Result      1.  Multilevel degenerative change of lumbar spine, most apparent at L3-4.   2.  Mild levoconvex curvature.   3.  No acute lumbar spine fracture or subluxation.      DX-CHEST-PORTABLE (1 VIEW)   Final Result      1.  Negative single view of the chest.      2.  Resolution of right lower lobe pneumonia      CT-HEAD W/O   Final Result      1.  No evidence of acute intracranial process.      2.  Periventricular chronic small vessel ischemic change.      Echocardiogram Comp W/O Cont    (Results Pending)   Carotid Duplex ([facility] [place] [facility] Only)    (Results Pending)     EKG:   per my independant read:  Normal sinus rhythm, bradycardia, rate 52, no clear evidence of acute ST segment changes appreciated    Assessment and Plan:      * Syncope- (present on admission)   Assessment & Plan    -Appears vasovagal and history though a sinus bradycardia and does have multiple risk factors for cardiac etiology, I cannot appreciate any obvious murmurs on physical exam  -Tech TSH with reflex T4, echocardiogram, telemetric monitoring, carotid duplex, orthostasis        Hyperglycemia- (present on admission)   Assessment & Plan    -Check glycohemoglobin to rule out  diabetes        Homelessness- (present on admission)   Assessment & Plan    -Social work consult        Sinus bradycardia- (present on admission)   Assessment & Plan    -No evidence of heart block, could be contributing to syncopal episode today  -Monitor on telemetry check echocardiogram and trend troponins closely  -        Peripheral neuropathy (CMS-HCC)- (present on admission)   Assessment & Plan    -Could be related to alcoholism and undiagnosed diabetes, does have a history of cervical neck injury but will check x-ray of the lumbar spine and thoracic spine and it is persistent will consider MRI  -Physical therapy and occupational therapy evaluations        Alcohol abuse- (present on admission)   Assessment & Plan    -Ongoing, no recent binges, clear evidence of alcohol drop low threshold initiate CIWA protocol              1. Prophylaxis: sc heparin  2. Code: Full code per patient with himself present

## 2017-11-28 NOTE — RESPIRATORY CARE
COPD EDUCATION by COPD CLINICAL EDUCATOR  11/28/2017 at 6:19 AM by Anika Hough     Patient reviewed by COPD education team. Patient does not qualify for COPD program.

## 2017-11-28 NOTE — THERAPY
"Occupational Therapy Evaluation completed.   Functional Status:  Pt up in chair at start of session.  CGA sit to stand.  CGA to don socks.  Pt walked hallway with FWW.  Pt declined further ADL's.  Plan of Care: Will benefit from Occupational Therapy 2 times per week  Discharge Recommendations:  Equipment: Front-Wheel Walker and Shower Chair. Post-acute therapy Discharge to home with outpatient or home health for additional skilled therapy services.    See \"Rehab Therapy-Acute\" Patient Summary Report for complete documentation.    "

## 2017-11-28 NOTE — PROGRESS NOTES
2 RN skin check completed: Pt skin shows no s/s of pressure related breakdown. Scabbed, itching rash to lateral thighs and buttocks. Generalized scabbed lesions.

## 2017-11-28 NOTE — THERAPY
"Physical Therapy Evaluation completed.   Bed Mobility:  Supine to Sit:  (up chair)  Transfers: Sit to Stand: Contact Guard Assist  Gait: Level Of Assist: Supervised with Front-Wheel Walker       Plan of Care: Will benefit from Physical Therapy 2 times per week  Discharge Recommendations: Equipment: Front-Wheel Walker. Post-acute therapy Discharge to home with outpatient or home health for additional skilled therapy services.  Pt presents close to baseline function with chronic knee pain that limits tolerance for ambulation. Pt would be safer using FWW but is refusing this due to risk of this making him a target in his neighborhood. Ed done to recommend at least using FWW in his room since this is where he has fallen. Pt will benefit from inpt PT to address problems and assist with d/c plan.     See \"Rehab Therapy-Acute\" Patient Summary Report for complete documentation.     "

## 2017-11-29 VITALS
HEART RATE: 56 BPM | DIASTOLIC BLOOD PRESSURE: 58 MMHG | TEMPERATURE: 98.3 F | HEIGHT: 72 IN | RESPIRATION RATE: 18 BRPM | OXYGEN SATURATION: 93 % | BODY MASS INDEX: 23.5 KG/M2 | SYSTOLIC BLOOD PRESSURE: 105 MMHG | WEIGHT: 173.5 LBS

## 2017-11-29 PROBLEM — F10.10 ALCOHOL ABUSE: Status: RESOLVED | Noted: 2017-11-27 | Resolved: 2017-11-29

## 2017-11-29 PROBLEM — R55 SYNCOPE: Status: RESOLVED | Noted: 2017-11-27 | Resolved: 2017-11-29

## 2017-11-29 PROBLEM — R00.1 SINUS BRADYCARDIA: Status: RESOLVED | Noted: 2017-11-27 | Resolved: 2017-11-29

## 2017-11-29 PROBLEM — G62.9 PERIPHERAL NEUROPATHY: Status: RESOLVED | Noted: 2017-11-27 | Resolved: 2017-11-29

## 2017-11-29 PROBLEM — R73.9 HYPERGLYCEMIA: Status: RESOLVED | Noted: 2017-11-27 | Resolved: 2017-11-29

## 2017-11-29 LAB
ALBUMIN SERPL BCP-MCNC: 3.4 G/DL (ref 3.2–4.9)
ALBUMIN/GLOB SERPL: 1.2 G/DL
ALP SERPL-CCNC: 52 U/L (ref 30–99)
ALT SERPL-CCNC: 22 U/L (ref 2–50)
ANION GAP SERPL CALC-SCNC: 8 MMOL/L (ref 0–11.9)
AST SERPL-CCNC: 22 U/L (ref 12–45)
BASOPHILS # BLD AUTO: 1.3 % (ref 0–1.8)
BASOPHILS # BLD: 0.09 K/UL (ref 0–0.12)
BILIRUB SERPL-MCNC: 0.4 MG/DL (ref 0.1–1.5)
BUN SERPL-MCNC: 8 MG/DL (ref 8–22)
CALCIUM SERPL-MCNC: 8.8 MG/DL (ref 8.5–10.5)
CHLORIDE SERPL-SCNC: 109 MMOL/L (ref 96–112)
CO2 SERPL-SCNC: 21 MMOL/L (ref 20–33)
CREAT SERPL-MCNC: 0.7 MG/DL (ref 0.5–1.4)
EOSINOPHIL # BLD AUTO: 0.31 K/UL (ref 0–0.51)
EOSINOPHIL NFR BLD: 4.3 % (ref 0–6.9)
ERYTHROCYTE [DISTWIDTH] IN BLOOD BY AUTOMATED COUNT: 42.7 FL (ref 35.9–50)
GFR SERPL CREATININE-BSD FRML MDRD: >60 ML/MIN/1.73 M 2
GLOBULIN SER CALC-MCNC: 2.8 G/DL (ref 1.9–3.5)
GLUCOSE SERPL-MCNC: 93 MG/DL (ref 65–99)
HCT VFR BLD AUTO: 44.7 % (ref 42–52)
HGB BLD-MCNC: 14.8 G/DL (ref 14–18)
IMM GRANULOCYTES # BLD AUTO: 0.02 K/UL (ref 0–0.11)
IMM GRANULOCYTES NFR BLD AUTO: 0.3 % (ref 0–0.9)
LYMPHOCYTES # BLD AUTO: 2.57 K/UL (ref 1–4.8)
LYMPHOCYTES NFR BLD: 36 % (ref 22–41)
MAGNESIUM SERPL-MCNC: 1.8 MG/DL (ref 1.5–2.5)
MCH RBC QN AUTO: 30.9 PG (ref 27–33)
MCHC RBC AUTO-ENTMCNC: 33.1 G/DL (ref 33.7–35.3)
MCV RBC AUTO: 93.3 FL (ref 81.4–97.8)
MONOCYTES # BLD AUTO: 0.78 K/UL (ref 0–0.85)
MONOCYTES NFR BLD AUTO: 10.9 % (ref 0–13.4)
NEUTROPHILS # BLD AUTO: 3.36 K/UL (ref 1.82–7.42)
NEUTROPHILS NFR BLD: 47.2 % (ref 44–72)
NRBC # BLD AUTO: 0 K/UL
NRBC BLD AUTO-RTO: 0 /100 WBC
PLATELET # BLD AUTO: 157 K/UL (ref 164–446)
PMV BLD AUTO: 12 FL (ref 9–12.9)
POTASSIUM SERPL-SCNC: 3.8 MMOL/L (ref 3.6–5.5)
PROT SERPL-MCNC: 6.2 G/DL (ref 6–8.2)
RBC # BLD AUTO: 4.79 M/UL (ref 4.7–6.1)
SODIUM SERPL-SCNC: 138 MMOL/L (ref 135–145)
WBC # BLD AUTO: 7.1 K/UL (ref 4.8–10.8)

## 2017-11-29 PROCEDURE — 700102 HCHG RX REV CODE 250 W/ 637 OVERRIDE(OP): Performed by: INTERNAL MEDICINE

## 2017-11-29 PROCEDURE — 36415 COLL VENOUS BLD VENIPUNCTURE: CPT

## 2017-11-29 PROCEDURE — 80053 COMPREHEN METABOLIC PANEL: CPT

## 2017-11-29 PROCEDURE — 83735 ASSAY OF MAGNESIUM: CPT

## 2017-11-29 PROCEDURE — G0378 HOSPITAL OBSERVATION PER HR: HCPCS

## 2017-11-29 PROCEDURE — 85025 COMPLETE CBC W/AUTO DIFF WBC: CPT

## 2017-11-29 PROCEDURE — 700111 HCHG RX REV CODE 636 W/ 250 OVERRIDE (IP): Performed by: INTERNAL MEDICINE

## 2017-11-29 PROCEDURE — A9270 NON-COVERED ITEM OR SERVICE: HCPCS | Performed by: INTERNAL MEDICINE

## 2017-11-29 PROCEDURE — 99217 PR OBSERVATION CARE DISCHARGE: CPT | Performed by: HOSPITALIST

## 2017-11-29 RX ORDER — LANOLIN ALCOHOL/MO/W.PET/CERES
100 CREAM (GRAM) TOPICAL DAILY
Qty: 30 TAB | Refills: 1 | Status: SHIPPED | OUTPATIENT
Start: 2017-11-29

## 2017-11-29 RX ORDER — FOLIC ACID 1 MG/1
1 TABLET ORAL DAILY
Qty: 30 TAB | Refills: 1 | Status: SHIPPED | OUTPATIENT
Start: 2017-11-29

## 2017-11-29 RX ADMIN — HEPARIN SODIUM 5000 UNITS: 5000 INJECTION, SOLUTION INTRAVENOUS; SUBCUTANEOUS at 06:28

## 2017-11-29 RX ADMIN — THERA TABS 1 TABLET: TAB at 08:24

## 2017-11-29 RX ADMIN — KETOCONAZOLE: 20 CREAM TOPICAL at 08:27

## 2017-11-29 RX ADMIN — NICOTINE 14 MG: 14 PATCH, EXTENDED RELEASE TRANSDERMAL at 06:28

## 2017-11-29 RX ADMIN — Medication 100 MG: at 08:24

## 2017-11-29 RX ADMIN — FOLIC ACID 1 MG: 1 TABLET ORAL at 08:23

## 2017-11-29 ASSESSMENT — PAIN SCALES - GENERAL: PAINLEVEL_OUTOF10: 0

## 2017-11-29 ASSESSMENT — LIFESTYLE VARIABLES: EVER_SMOKED: YES

## 2017-11-29 NOTE — PROGRESS NOTES
Assumed care of Pt. Whiteboard updated. PT resting comfortably in bed reading his book. Personal items and call bell in reach.

## 2017-11-29 NOTE — CARE PLAN
Problem: Safety  Goal: Will remain free from injury  Outcome: PROGRESSING AS EXPECTED  PT refuses bed alarm but does call for assistance.

## 2017-11-29 NOTE — PROGRESS NOTES
[facility] Hospitalist Progress Note    Date of Service: 2017    Chief Complaint  67 y.o. male admitted 2017 with syncopy.    Interval Problem Update  No acute issues overnight, patient says he feels great. Patient denies fevers/chills, chest pain, shortness of breath or nausea/vommiting. Denies vision changes or headaches. Denies having dizziness.  Hx of previous episodes, vasovagal vs cardiogenic?  No arrhythmias overnight.  Check echocardiogram  Check carotid duplex.  PT/OT    Consultants/Specialty  None    Disposition  TBD        Review of Systems   Constitutional: Negative for chills, fever and malaise/fatigue.   HENT: Negative for congestion, hearing loss, sore throat and tinnitus.    Eyes: Negative for blurred vision, double vision, photophobia and pain.   Respiratory: Negative for cough, hemoptysis, sputum production, shortness of breath and stridor.    Cardiovascular: Negative for chest pain, palpitations, orthopnea, claudication and PND.   Gastrointestinal: Negative for blood in stool, constipation, heartburn, melena, nausea and vomiting.   Genitourinary: Negative for dysuria, frequency and urgency.   Musculoskeletal: Negative for back pain, myalgias and neck pain.   Neurological: Negative for dizziness, tingling, tremors, sensory change, speech change, weakness and headaches.   Psychiatric/Behavioral: Negative for depression, memory loss and suicidal ideas. The patient is not nervous/anxious.       Physical Exam  Laboratory/Imaging   Hemodynamics  Temp (24hrs), Av.6 °C (97.8 °F), Min:36.2 °C (97.2 °F), Max:36.8 °C (98.3 °F)   Temperature: 36.7 °C (98 °F)  Pulse  Av.5  Min: 41  Max: 68   Blood Pressure : 146/83      Respiratory      Respiration: 20, Pulse Oximetry: 98 %, O2 Daily Delivery Respiratory : Room Air with O2 Available     Work Of Breathing / Effort: Mild  RUL Breath Sounds: Diminished, RML Breath Sounds: Diminished, RLL Breath Sounds: Diminished, DEWAYNE Breath Sounds: Diminished,  LLL Breath Sounds: Diminished    Fluids    Intake/Output Summary (Last 24 hours) at 11/28/17 1617  Last data filed at 11/28/17 1500   Gross per 24 hour   Intake                0 ml   Output              575 ml   Net             -575 ml       Nutrition  Orders Placed This Encounter   Procedures   • Diet Order     Standing Status:   Standing     Number of Occurrences:   1     Order Specific Question:   Diet:     Answer:   Cardiac [6]     Physical Exam   Constitutional: He is oriented to person, place, and time. He appears well-developed and well-nourished. No distress.   HENT:   Head: Normocephalic and atraumatic.   Mouth/Throat: No oropharyngeal exudate.   Eyes: Conjunctivae are normal. Pupils are equal, round, and reactive to light. Right eye exhibits no discharge. No scleral icterus.   Neck: Neck supple. No JVD present. No thyromegaly present.   Cardiovascular: Normal rate and intact distal pulses.    No murmur heard.  Pulmonary/Chest: Effort normal and breath sounds normal. No stridor. No respiratory distress. He has no wheezes. He has no rales.   Abdominal: Soft. Bowel sounds are normal. He exhibits no distension. There is no tenderness. There is no rebound.   Musculoskeletal: Normal range of motion. He exhibits no edema.   Neurological: He is alert and oriented to person, place, and time. No cranial nerve deficit.   Skin: Skin is warm. He is not diaphoretic. No erythema.   Psychiatric: He has a normal mood and affect. His behavior is normal. Thought content normal.       Recent Labs      11/27/17   1025  11/28/17   0154   WBC  7.7  8.5   RBC  5.16  4.79   HEMOGLOBIN  16.0  14.6   HEMATOCRIT  48.9  45.0   MCV  94.8  93.9   MCH  31.0  30.5   MCHC  32.7*  32.4*   RDW  43.8  43.3   PLATELETCT  163*  155*   MPV  11.9  11.9     Recent Labs      11/27/17   1025  11/28/17   0147   SODIUM  137  138   POTASSIUM  4.3  3.9   CHLORIDE  104  110   CO2  25  22   GLUCOSE  128*  94   BUN  14  13   CREATININE  1.13  0.83    CALCIUM  9.1  8.6     Recent Labs      11/27/17   1025   APTT  24.2*   INR  1.14*     Recent Labs      11/27/17   1025   BNPBTYPENAT  16     Recent Labs      11/28/17   0147   TRIGLYCERIDE  94   HDL  44   LDL  77          Assessment/Plan     * Syncope- (present on admission)   Assessment & Plan    Hx of previous episodes, vasovagal vs cardiogenic?  No arrhythmias overnight.  Check echocardiogram  Check carotid duplex.  PT/OT  TSH, WNL        Hyperglycemia- (present on admission)   Assessment & Plan    A1c: normal,         Homelessness- (present on admission)   Assessment & Plan    Social work consulted.         Sinus bradycardia- (present on admission)   Assessment & Plan    Stable, HR stable.        Peripheral neuropathy (CMS-HCC)- (present on admission)   Assessment & Plan    2/2 to alcoholism?   PT/OT  Consider imaging if not improving.        Alcohol abuse- (present on admission)   Assessment & Plan    Patient says he has not had a drink in 4-5 days. No signs of withdrawal.          Patient plan of care discussed at multidisplinary team rounds and with patient and R.N at beside.      Reviewed items::  Labs reviewed, EKG reviewed, Medications reviewed and Radiology images reviewed  Pedersen catheter::  No Pedersen  DVT prophylaxis - mechanical:  SCDs

## 2017-11-29 NOTE — PROGRESS NOTES
Pt discharged but has no means of transportation, spoke with case management who provided patient with a bus pass. Also states no PCP. PCP appointment provided.  IV & tele d/c'd. Discharge paperwork discussed and signed, copy provided in chart. Pt d/c'd to apartment at 9:40am

## 2017-11-29 NOTE — DISCHARGE INSTRUCTIONS
Discharge Instructions    Discharged to home by bus with self. Discharged via wheelchair, hospital escort: Yes.  Special equipment needed: Cane    Be sure to schedule a follow-up appointment with your primary care doctor or any specialists as instructed.     Discharge Plan:   Diet Plan: Discussed  Activity Level: Discussed  Smoking Cessation Offered: Patient Refused  Confirmed Follow up Appointment: Patient to Call and Schedule Appointment  Confirmed Symptoms Management: Discussed  Medication Reconciliation Updated: Yes  Influenza Vaccine Indication: Patient Refuses    I understand that a diet low in cholesterol, fat, and sodium is recommended for good health. Unless I have been given specific instructions below for another diet, I accept this instruction as my diet prescription.   Other diet: x    Special Instructions: None    · Is patient discharged on Warfarin / Coumadin?   No     · Is patient Post Blood Transfusion?  No      Syncope  Syncope is a medical term for fainting or passing out. This means you lose consciousness and drop to the ground. People are generally unconscious for less than 5 minutes. You may have some muscle twitches for up to 15 seconds before waking up and returning to normal. Syncope occurs more often in older adults, but it can happen to anyone. While most causes of syncope are not dangerous, syncope can be a sign of a serious medical problem. It is important to seek medical care.   CAUSES   Syncope is caused by a sudden drop in blood flow to the brain. The specific cause is often not determined. Factors that can bring on syncope include:  · Taking medicines that lower blood pressure.  · Sudden changes in posture, such as standing up quickly.  · Taking more medicine than prescribed.  · Standing in one place for too long.  · Seizure disorders.  · Dehydration and excessive exposure to heat.  · Low blood sugar (hypoglycemia).  · Straining to have a bowel movement.  · Heart disease, irregular  heartbeat, or other circulatory problems.  · Fear, emotional distress, seeing blood, or severe pain.  SYMPTOMS   Right before fainting, you may:  · Feel dizzy or light-headed.  · Feel nauseous.  · See all white or all black in your field of vision.  · Have cold, clammy skin.  DIAGNOSIS   Your health care provider will ask about your symptoms, perform a physical exam, and perform an electrocardiogram (ECG) to record the electrical activity of your heart. Your health care provider may also perform other heart or blood tests to determine the cause of your syncope which may include:  · Transthoracic echocardiogram (TTE). During echocardiography, sound waves are used to evaluate how blood flows through your heart.  · Transesophageal echocardiogram (NICHOL).  · Cardiac monitoring. This allows your health care provider to monitor your heart rate and rhythm in real time.  · Holter monitor. This is a portable device that records your heartbeat and can help diagnose heart arrhythmias. It allows your health care provider to track your heart activity for several days, if needed.  · Stress tests by exercise or by giving medicine that makes the heart beat faster.  TREATMENT   In most cases, no treatment is needed. Depending on the cause of your syncope, your health care provider may recommend changing or stopping some of your medicines.  HOME CARE INSTRUCTIONS  · Have someone stay with you until you feel stable.  · Do not drive, use machinery, or play sports until your health care provider says it is okay.  · Keep all follow-up appointments as directed by your health care provider.  · Lie down right away if you start feeling like you might faint. Breathe deeply and steadily. Wait until all the symptoms have passed.  · Drink enough fluids to keep your urine clear or pale yellow.  · If you are taking blood pressure or heart medicine, get up slowly and take several minutes to sit and then stand. This can reduce dizziness.  SEEK  "IMMEDIATE MEDICAL CARE IF:   · You have a severe headache.  · You have unusual pain in the chest, abdomen, or back.  · You are bleeding from your mouth or rectum, or you have black or tarry stool.  · You have an irregular or very fast heartbeat.  · You have pain with breathing.  · You have repeated fainting or seizure-like jerking during an episode.  · You faint when sitting or lying down.  · You have confusion.  · You have trouble walking.  · You have severe weakness.  · You have vision problems.  If you fainted, call your local emergency services (911 in [place] Do not drive yourself to the hospital.      This information is not intended to replace advice given to you by your health care provider. Make sure you discuss any questions you have with your health care provider.     Document Released: 12/18/2006 Document Revised: 05/03/2016 Document Reviewed: 02/15/2013  [employer] Interactive Patient Education ©2016 [employer]        You Can Quit Smoking  If you are ready to quit smoking or are thinking about it, congratulations! You have chosen to help yourself be healthier and live longer! There are lots of different ways to quit smoking. Nicotine gum, nicotine patches, a nicotine inhaler, or nicotine nasal spray can help with physical craving. Hypnosis, support groups, and medicines help break the habit of smoking.  TIPS TO GET OFF AND STAY OFF CIGARETTES  · Learn to predict your moods. Do not let a bad situation be your excuse to have a cigarette. Some situations in your life might tempt you to have a cigarette.  · Ask friends and co-workers not to smoke around you.  · Make your home smoke-free.  · Never have \"just one\" cigarette. It leads to wanting another and another. Remind yourself of your decision to quit.  · On a card, make a list of your reasons for not smoking. Read it at least the same number of times a day as you have a cigarette. Tell yourself everyday, \"I do not want to smoke. I choose not to " "smoke.\"  · Ask someone at home or work to help you with your plan to quit smoking.  · Have something planned after you eat or have a cup of coffee. Take a walk or get other exercise to perk you up. This will help to keep you from overeating.  · Try a relaxation exercise to calm you down and decrease your stress. Remember, you may be tense and nervous the first two weeks after you quit. This will pass.  · Find new activities to keep your hands busy. Play with a pen, coin, or rubber band. Doodle or draw things on paper.  · Brush your teeth right after eating. This will help cut down the craving for the taste of tobacco after meals. You can try mouthwash too.  · Try gum, breath mints, or diet candy to keep something in your mouth.  IF YOU SMOKE AND WANT TO QUIT:  · Do not stock up on cigarettes. Never buy a carton. Wait until one pack is finished before you buy another.  · Never carry cigarettes with you at work or at home.  · Keep cigarettes as far away from you as possible. Leave them with someone else.  · Never carry matches or a lighter with you.  · Ask yourself, \"Do I need this cigarette or is this just a reflex?\"  · Bet with someone that you can quit. Put cigarette money in a [employer] bank every morning. If you smoke, you give up the money. If you do not smoke, by the end of the week, you keep the money.  · Keep trying. It takes 21 days to change a habit!  · Talk to your doctor about using medicines to help you quit. These include nicotine replacement gum, lozenges, or skin patches.     This information is not intended to replace advice given to you by your health care provider. Make sure you discuss any questions you have with your health care provider.     Document Released: 10/14/2010 Document Revised: 03/11/2013 Document Reviewed: 10/14/2010  [employer] Interactive Patient Education ©2016 [employer]    Depression / Suicide Risk    As you are discharged from this [facility] facility, it is important to learn " how to keep safe from harming yourself.    Recognize the warning signs:  · Abrupt changes in personality, positive or negative- including increase in energy   · Giving away possessions  · Change in eating patterns- significant weight changes-  positive or negative  · Change in sleeping patterns- unable to sleep or sleeping all the time   · Unwillingness or inability to communicate  · Depression  · Unusual sadness, discouragement and loneliness  · Talk of wanting to die  · Neglect of personal appearance   · Rebelliousness- reckless behavior  · Withdrawal from people/activities they love  · Confusion- inability to concentrate     If you or a loved one observes any of these behaviors or has concerns about self-harm, here's what you can do:  · Talk about it- your feelings and reasons for harming yourself  · Remove any means that you might use to hurt yourself (examples: pills, rope, extension cords, firearm)  · Get professional help from the community (Mental Health, Substance Abuse, psychological counseling)  · Do not be alone:Call your Safe Contact- someone whom you trust who will be there for you.  · Call your local CRISIS HOTLINE 495-4643 or 819-263-6449  · Call your local Children's Mobile Crisis Response Team [place] (844) 957-2144 or www.[employer]  · Call the toll free National Suicide Prevention Hotlines   · National Suicide Prevention Lifeline 494-952-ONQQ (1333)  · National Hope Line Network 800-SUICIDE (906-7812)

## 2017-11-29 NOTE — CARE PLAN
Problem: Nutritional:  Goal: Achieve adequate nutritional intake  Patient will consume >50% of meals  Outcome: MET Date Met: 11/28/17  Pt ate % of last two documented meals. RN confirmed pt with good appetite. RN reports pt with no pressure ulcers at this time (wounds noted in admit screen). RD available PRN.

## 2017-11-29 NOTE — CARE PLAN
Problem: Knowledge Deficit  Goal: Knowledge of disease process/condition, treatment plan, diagnostic tests, and medications will improve  Patient has been educated on his POC, including his echo and carotid duplex. Patient verbalizes that he would like to go home, but is agreeable to tests and POC.

## 2017-11-29 NOTE — PROGRESS NOTES
Rosa Wolff Fall Risk Assessment:     Last Known Fall: Within the last month  Mobility: Dizziness/generalized weakness  Medications: Cardiovascular or central nervous system meds  Mental Status/LOC/Awareness: Awake, alert, and oriented to date, place, and person  Toileting Needs: Use of assistive device (Bedside commode, bedpan, urinal)  Volume/Electrolyte Status: No problems  Communication/Sensory: No deficits  Behavior: Appropriate behavior  Rosa Wolff Fall Risk Total: 10  Fall Risk Level: LOW RISK    Universal Fall Precautions:  call light/belongings in reach, bed in low position and locked, siderails up x 2, use non-slip footwear, clutter free and spill free environment, educate on level of risk, educate to call for assistance    Fall Risk Level Interventions:   TRIAL (DNAtriX, NEURO, MED ANA 5) Low Fall Risk Interventions  Place yellow fall risk ID band on patient: verified  Provide patient/family education based on risk assessment: verified  Educate patient/family to call staff for assistance when getting out of bed: verified  Place fall precaution signage outside patient door: verifiedTRIAL (DNAtriX, NEURO, MED ANA 5) Moderate Fall Risk Interventions  Place yellow fall risk ID band on patient: completed  Provide patient/family education based on risk assessment : completed  Educate patient/family to call staff for assistance when getting out of bed: completed  Place fall precaution signage outside patient door: completed  Utilize bed/chair fall alarm: refused     Patient Specific Interventions:     Medication: review medications with patient and family  Mental Status/LOC/Awareness: reinforce falls education  Toileting: instruct male patients prone to dizziness to void while sitting  Volume/Electrolyte Status: ensure IV fluids are appropriate  Communication/Sensory: update plan of care on whiteboard  Behavioral: encourage patient to voice feelings  Mobility: dangle prior to standing

## 2017-11-29 NOTE — PROGRESS NOTES
Rosa Wolff Fall Risk Assessment:     Last Known Fall: Within the last month  Mobility: Dizziness/generalized weakness  Medications: Cardiovascular or central nervous system meds  Mental Status/LOC/Awareness: Awake, alert, and oriented to date, place, and person  Toileting Needs: Use of assistive device (Bedside commode, bedpan, urinal)  Volume/Electrolyte Status: Use of IV fluids/tube feeds  Communication/Sensory: Visual (Glasses)/hearing deficit  Behavior: Appropriate behavior  Rosa Wolff Fall Risk Total: 13  Fall Risk Level: MODERATE RISK    Universal Fall Precautions:  call light/belongings in reach, bed in low position and locked, wheelchairs and assistive devices out of sight, siderails up x 2, adequate lighting, use non-slip footwear, clutter free and spill free environment, educate to call for assistance, educate on level of risk    Fall Risk Level Interventions:    TRIAL (TELE 8, NEURO, MED ANA 5) Moderate Fall Risk Interventions  Place yellow fall risk ID band on patient: completed  Provide patient/family education based on risk assessment : completed  Educate patient/family to call staff for assistance when getting out of bed: completed  Place fall precaution signage outside patient door: completed  Utilize bed/chair fall alarm: refused     Patient Specific Interventions:     Medication: review medications with patient and family  Mental Status/LOC/Awareness: not applicable  Toileting: monitor intake and output/use of appropriate interventions, instruct male patients prone to dizziness to void while sitting, instruct patient/family on the use of grab bars and instruct patient/family on the need to call for assistance when toileting  Volume/Electrolyte Status: ensure patient remains hydrated, monitor abnormal lab values and ensure IV fluids are appropriate  Communication/Sensory: update plan of care on whiteboard  Behavioral: encourage patient to voice feelings and engage patient in daily  activities  Mobility: utilize bed/chair fall alarm and ensure bed is locked and in lowest position

## 2017-11-30 ENCOUNTER — PATIENT OUTREACH (OUTPATIENT)
Dept: HEALTH INFORMATION MANAGEMENT | Facility: OTHER | Age: 67
End: 2017-11-30

## 2017-11-30 NOTE — DISCHARGE SUMMARY
CHIEF COMPLAINT ON ADMISSION  Chief Complaint   Patient presents with   • Dizziness   • Syncope       CODE STATUS  Prior    HPI & HOSPITAL COURSE  This is a 67 y.o. male here with syncopy. On admission patient was admitted to the telemetry unit for closer monitoring, hence gross arrhythmias noted. In addition a echocardiogram and carotid duplex was performed which were grossly negative. Hence at this point differentials include, vasovagal syncopy, for this recommended adequate hydration and cessation of alcohol use. Overall patient feels great and wanted to be discharged. Patient denies fevers/chills, chest pain, shortness of breath or nausea/vommiting.       Therefore, he is discharged in good and stable condition with close outpatient follow-up.    SPECIFIC OUTPATIENT FOLLOW-UP  PCP 1 week    DISCHARGE PROBLEM LIST  Principal Problem (Resolved):    Syncope POA: Yes  Active Problems:    Homelessness POA: Yes  Resolved Problems:    Alcohol abuse POA: Yes    Peripheral neuropathy (CMS-HCC) POA: Yes    Sinus bradycardia POA: Yes    Hyperglycemia POA: Yes      FOLLOW UP  Future Appointments  Date Time Provider Department Center   11/30/2017 11:20 AM CARE MANAGER [facility]   12/6/2017 1:00 PM TARIK Liriano [facility]   12/26/2017 2:20 PM Niels Gavin M.D. [address]     No follow-up provider specified.    MEDICATIONS ON DISCHARGE   Chepe Persaud   Home Medication Instructions ALBERTO:[id number]    Printed on:11/29/17 1611   Medication Information                      cyanocobalamin (VITAMIN B12) 1000 MCG Tab  Take 1 Tab by mouth every day.             folic acid (FOLVITE) 1 MG Tab  Take 1 Tab by mouth every day.             multivitamin (THERAGRAN) Tab  Take 1 Tab by mouth every day.             thiamine (THIAMINE) 100 MG tablet  Take 1 Tab by mouth every day.                 DIET  No orders of the defined types were placed in this encounter.      ACTIVITY  As tolerated.  Weight bearing as  tolerated      CONSULTATIONS  None    PROCEDURES  None    LABORATORY  Lab Results   Component Value Date/Time    SODIUM 138 11/29/2017 02:55 AM    POTASSIUM 3.8 11/29/2017 02:55 AM    CHLORIDE 109 11/29/2017 02:55 AM    CO2 21 11/29/2017 02:55 AM    GLUCOSE 93 11/29/2017 02:55 AM    BUN 8 11/29/2017 02:55 AM    CREATININE 0.70 11/29/2017 02:55 AM    CREATININE 1.1 01/18/2009 04:20 AM        Lab Results   Component Value Date/Time    WBC 7.1 11/29/2017 02:55 AM    HEMOGLOBIN 14.8 11/29/2017 02:55 AM    HEMATOCRIT 44.7 11/29/2017 02:55 AM    PLATELETCT 157 (L) 11/29/2017 02:55 AM        Total time of the discharge process exceeds 40 minutes

## 2017-12-06 ENCOUNTER — OFFICE VISIT (OUTPATIENT)
Dept: MEDICAL GROUP | Facility: CLINIC | Age: 67
End: 2017-12-06
Payer: MEDICARE

## 2017-12-06 VITALS
SYSTOLIC BLOOD PRESSURE: 98 MMHG | TEMPERATURE: 98.8 F | BODY MASS INDEX: 22.48 KG/M2 | RESPIRATION RATE: 16 BRPM | WEIGHT: 166 LBS | HEART RATE: 86 BPM | OXYGEN SATURATION: 96 % | DIASTOLIC BLOOD PRESSURE: 68 MMHG | HEIGHT: 72 IN

## 2017-12-06 DIAGNOSIS — Z09 HOSPITAL DISCHARGE FOLLOW-UP: ICD-10-CM

## 2017-12-06 DIAGNOSIS — R55 SYNCOPE AND COLLAPSE: ICD-10-CM

## 2017-12-06 DIAGNOSIS — R06.02 SOB (SHORTNESS OF BREATH) ON EXERTION: ICD-10-CM

## 2017-12-06 DIAGNOSIS — F10.930 ALCOHOL WITHDRAWAL SYNDROME WITHOUT COMPLICATION (HCC): ICD-10-CM

## 2017-12-06 DIAGNOSIS — F17.200 SMOKING: ICD-10-CM

## 2017-12-06 PROBLEM — R50.9 FEVER: Status: RESOLVED | Noted: 2017-09-07 | Resolved: 2017-12-06

## 2017-12-06 PROBLEM — A41.9 SEPSIS (HCC): Status: RESOLVED | Noted: 2017-09-07 | Resolved: 2017-12-06

## 2017-12-06 PROBLEM — E87.6 HYPOKALEMIA: Status: RESOLVED | Noted: 2017-09-07 | Resolved: 2017-12-06

## 2017-12-06 PROBLEM — J18.9 PNA (PNEUMONIA): Status: RESOLVED | Noted: 2017-09-07 | Resolved: 2017-12-06

## 2017-12-06 PROBLEM — E87.20 LACTIC ACIDOSIS: Status: RESOLVED | Noted: 2017-09-07 | Resolved: 2017-12-06

## 2017-12-06 PROBLEM — R07.9 CHEST PAIN: Status: RESOLVED | Noted: 2017-09-14 | Resolved: 2017-12-06

## 2017-12-06 PROBLEM — R74.01 TRANSAMINITIS: Status: RESOLVED | Noted: 2017-09-07 | Resolved: 2017-12-06

## 2017-12-06 PROBLEM — D72.829 LEUKOCYTOSIS: Status: RESOLVED | Noted: 2017-09-07 | Resolved: 2017-12-06

## 2017-12-06 PROCEDURE — 99496 TRANSJ CARE MGMT HIGH F2F 7D: CPT | Performed by: NURSE PRACTITIONER

## 2017-12-06 RX ORDER — ALBUTEROL SULFATE 90 UG/1
2 AEROSOL, METERED RESPIRATORY (INHALATION) EVERY 6 HOURS PRN
Qty: 8.5 G | Refills: 3 | Status: SHIPPED | OUTPATIENT
Start: 2017-12-06

## 2017-12-06 ASSESSMENT — ENCOUNTER SYMPTOMS
HEARTBURN: 0
COUGH: 0
EYE PAIN: 0
WHEEZING: 0
TREMORS: 0
BLURRED VISION: 0
FEVER: 0
ABDOMINAL PAIN: 0
VOMITING: 0
DEPRESSION: 0
DIZZINESS: 0
NAUSEA: 0
SHORTNESS OF BREATH: 1
FOCAL WEAKNESS: 0
CHILLS: 0
MYALGIAS: 0
SEIZURES: 0
SPUTUM PRODUCTION: 0
HEADACHES: 0
PALPITATIONS: 0
FALLS: 0
WEAKNESS: 0
NERVOUS/ANXIOUS: 0

## 2017-12-06 ASSESSMENT — PATIENT HEALTH QUESTIONNAIRE - PHQ9
CLINICAL INTERPRETATION OF PHQ2 SCORE: 2
5. POOR APPETITE OR OVEREATING: 2 - MORE THAN HALF THE DAYS
SUM OF ALL RESPONSES TO PHQ QUESTIONS 1-9: 9

## 2017-12-06 NOTE — PROGRESS NOTES
Subjective:     Chepe Persaud is a 67 y.o. male who presents for Hospital Follow-up.  Chart reviewed. Discharge summary available for review: Yes   Date of discharge 11/29/2017.  48- hour post discharge RN call completed on 11/30/2017 and documented in the medical record by  Hattie Rob..    HPI: Recently hospitalized for dizziness, syncope, negative stroke work-up, normal echo, possibly vasovagal syncope. 11/27 TSH wnl. 11/27 EKG sinus bradycardia with HR 53.    Smoking  Pt reports he is cutting back from 1.5 pack per day to 3-4 cigarettes per day. Pt declined nicotine patch.     Alcohol withdrawal (CMS-HCC) in September 2017  Pt reports still drinking 3-4 beers per day but he has tried to cut down and hopefully to quit eventually. He denied any tremor, increased anxiety, disoriented or seizure. No episode of syncope after discharge.     Pt has picked up thiamine and folic acid but has not started yet. He reports he will start taking today.    Syncope and collapse  Pt reports that he fell from stage about 3 years ago and ever since then he has had 4-5 episodes of syncope. He can not recall how long it last every time when he passed out. He reports he was told once that his heart rate went down to 30s. His EKG in the hospital shows sinus bradycardia with HR 53 on 11/27. His HR 86 regular in the clinic today. BP 98/68. Pt denied symptoms at this time. No dizziness, no palpitation, no chest pain. He reports leg weakness but he has bad knee bilaterally. He uses cane to walk. No fall incident after discharge. Denied difficulty taking care of self at home.     Pt reports no new symptoms after discharge except he felt a little bit sob this morning and then it went away.      New to PCP on 12/26.    Patient Active Problem List    Diagnosis Date Noted   • Syncope and collapse 11/27/2017     Priority: High   • Liver disease 09/07/2017     Priority: Medium   • Alcohol withdrawal (CMS-HCC) in September 2017  09/08/2017     Priority: Low   • Smoking 09/07/2017     Priority: Low   • Homelessness 11/27/2017         Allergies:   Codeine    Social History:  Social History   Substance Use Topics   • Smoking status: Current Every Day Smoker     Packs/day: 0.50     Types: Cigarettes   • Smokeless tobacco: Never Used      Comment: pk/day   • Alcohol use Yes      Comment: a couple of beers/day        ROS:  Review of Systems   Constitutional: Negative for chills, fever and malaise/fatigue.   HENT: Negative for hearing loss and tinnitus.    Eyes: Negative for blurred vision and pain.   Respiratory: Positive for shortness of breath (a little bit sob this morning then it went away). Negative for cough, sputum production and wheezing.    Cardiovascular: Negative for chest pain, palpitations and leg swelling.   Gastrointestinal: Negative for abdominal pain, heartburn, nausea and vomiting.   Genitourinary: Negative for dysuria, frequency and urgency.   Musculoskeletal: Positive for joint pain (bilateral, chronic). Negative for falls and myalgias.   Skin: Negative for rash.   Neurological: Negative for dizziness, tremors, focal weakness, seizures, weakness and headaches.   Psychiatric/Behavioral: Negative for depression. The patient is not nervous/anxious.         Objective:     Blood pressure (!) 98/68, pulse 86, temperature 37.1 °C (98.8 °F), resp. rate 16, height 1.829 m (6'), weight 75.3 kg (166 lb), SpO2 96 %.     Physical Exam:  Physical Exam   Constitutional: He is oriented to person, place, and time and well-developed, well-nourished, and in no distress.   HENT:   Head: Normocephalic and atraumatic.   Eyes: Conjunctivae are normal.   Neck: Neck supple. No JVD present.   Cardiovascular: Normal rate and regular rhythm.    No murmur heard.  Pulmonary/Chest: Effort normal and breath sounds normal. No respiratory distress.   Abdominal: Soft. Bowel sounds are normal. He exhibits no distension. There is no tenderness. There is no  rebound.   Musculoskeletal: Normal range of motion. He exhibits no edema.   Neurological: He is alert and oriented to person, place, and time.   Gait steady with cane   Skin: Skin is warm.   Vitals reviewed.        Assessment and Plan:     1. Hospital discharge follow-up  Hospitalization and results reviewed with patient. High risk conditions requiring teaching or care coordination were identified and addressed.The patient demonstrate understanding of admission and underlying conditions. The patient understands discharge instructions and when to seek medical attention. Medications reviewed including instructions regarding high risk medications, dosing and side effects.    The patient is able to safely adhere to ADL/IADL, treatment and medication regimen, self-manage of high-risk conditions? Yes   The patient requires physical therapy/home health/DME referral? No   The patient requires referral to care coordination/behavioral health/social work?  No   Patient requires referral for pharmacy consult? No   Advance directive/POLST on file?  No   Required counseled on advance directive?  No     New to PCP on 12/26.  - HOLTER - Cardiology Performed (48HR); Future    2. Syncope and collapse  - HOLTER - Cardiology Performed (48HR); Future    3. Smoking  Cutting down. Declined nicotine patch    4. SOB (shortness of breath) on exertion  Smoker, cutting down.  - albuterol 108 (90 Base) MCG/ACT Aero Soln inhalation aerosol; Inhale 2 Puffs by mouth every 6 hours as needed for Shortness of Breath.  Dispense: 8.5 g; Refill: 3  - PULMONARY FUNCTION TESTS Test requested: Complete Pulmonary Function Test    5. Alcohol withdrawal syndrome without complication (CMS-HCC)  Encourage to quit. Pt reports he will. No sign of withdrawal at this time.   Pt to start thiamine and folate      Medication Reconciliation  Medication list at end of encounter:   Current Outpatient Prescriptions   Medication Sig Dispense Refill   • albuterol 108 (90  Base) MCG/ACT Aero Soln inhalation aerosol Inhale 2 Puffs by mouth every 6 hours as needed for Shortness of Breath. 8.5 g 3   • multivitamin (THERAGRAN) Tab Take 1 Tab by mouth every day. 30 Tab 1   • thiamine (THIAMINE) 100 MG tablet Take 1 Tab by mouth every day. 30 Tab 1   • folic acid (FOLVITE) 1 MG Tab Take 1 Tab by mouth every day. 30 Tab 1   • cyanocobalamin (VITAMIN B12) 1000 MCG Tab Take 1 Tab by mouth every day. 30 Tab 1     No current facility-administered medications for this visit.        Primary care follow-up:  New health conditions identified during hospitalization? Yes   Labs/pathology/imaging requires future PCP follow-up?  No   Changes to medications during hospitalization or today? Yes     Recommended followup: No Follow-up on file. with Pcp Pt States None   Future Appointments       Provider Department [place]    12/6/2017 1:00 PM TARIK Liriano [facility]    12/26/2017 2:20 PM Niels Gavin M.D. [place] [address]          Patient Instruction  Patient offered educational material on discharge diagnosis and management of symptoms/red flags. Patient instructed to keep follow-up appointments and to bring written questions and and actual medications to each office visit. Patient instructed to call PCP/specialist with any problems/questions/concerns. Patient verbalizes understanding and has no further questions at this time.    Face-to-face transitional care management services with high complexity medical decision making.

## 2017-12-06 NOTE — PROGRESS NOTES
POST DISCHARGE CALL MADE BY Hattie Rob.  Discharge Date:11/29/2017   Date of Outreach Call: 11/30/2017 11:18 AM  Now that you're home, how are you doing? Fair  Comment:Patient reports he is still feeling a little  weak. Reports he is taking fluids.  Patient states he  doesn't have food in his house.  CM provided patient with  contact information for [employer].   Do you have questions about your medications? No    Did you fill your medications? No  Comment:States he will get prescriptions today.    Do you have a follow-up appointment scheduled?Yes  Comment:CM reviewed follow-up appointment at discharge  clinic on 12/6/17. Patient states he will take the bus to  appointment.    Discharging Department: Telemetry 8    Number of Attempts: 1  Current or previous attempts completed within two business days of discharge? Yes  Provided education regarding treatment plan, medication, self-management, ADLs? Yes  Comment:Patient states he is currently living at  [facility] apartments.  CM provided patient with information  on [employer] and [employer] for resources for  food.   Has patient completed Advance Directive? If yes, advise them to bring to appointment. No  Care Manager phone number provided? Yes  Is there anything else I can help you with? No

## 2017-12-06 NOTE — ASSESSMENT & PLAN NOTE
Pt reports still drinking 3-4 beers per day but he has tried to cut down and hopefully to quit eventually. He denied any tremor, increased anxiety, disoriented or seizure. No episode of syncope after discharge.     Pt has picked up thiamine and folic acid but has not started yet. He reports he will start taking today.

## 2017-12-06 NOTE — ASSESSMENT & PLAN NOTE
Pt reports that he fell from stage about 3 years ago and ever since then he has had 4-5 episodes of syncope. He can not recall how long it last every time when he passed out. He reports he was told once that his heart rate went down to 30s. His EKG in the hospital shows sinus bradycardia with HR 53 on 11/27. His HR 86 regular in the clinic today. BP 98/68. Pt denied symptoms at this time. No dizziness, no palpitation, no chest pain. He reports leg weakness but he has bad knee bilaterally. He uses cane to walk. No fall incident after discharge. Denied difficulty taking care of self at home.     Pt reports no new symptoms after discharge except he felt a little bit sob this morning and then it went away.

## 2017-12-06 NOTE — ASSESSMENT & PLAN NOTE
Pt reports he is cutting back from 1.5 pack per day to 3-4 cigarettes per day. Pt declined nicotine patch.

## 2017-12-06 NOTE — PATIENT INSTRUCTIONS
If you need further assistance, or have any questions; concerns or lingering symptoms before seeing your Primary Care Provider or specialist.     Do not hesitate to contact us.     Please contact us at the Post-Hospital Follow Up Program at [phone].   Our offices hours are Monday-Friday 8 am-5 pm.

## 2017-12-26 ENCOUNTER — OFFICE VISIT (OUTPATIENT)
Dept: MEDICAL GROUP | Facility: MEDICAL CENTER | Age: 67
End: 2017-12-26
Payer: MEDICARE

## 2017-12-26 VITALS
HEART RATE: 57 BPM | RESPIRATION RATE: 16 BRPM | TEMPERATURE: 98.2 F | BODY MASS INDEX: 22.46 KG/M2 | WEIGHT: 165.8 LBS | OXYGEN SATURATION: 98 % | SYSTOLIC BLOOD PRESSURE: 122 MMHG | DIASTOLIC BLOOD PRESSURE: 78 MMHG | HEIGHT: 72 IN

## 2017-12-26 DIAGNOSIS — Z13.6 SCREENING FOR CARDIOVASCULAR CONDITION: ICD-10-CM

## 2017-12-26 DIAGNOSIS — R55 SYNCOPE, UNSPECIFIED SYNCOPE TYPE: ICD-10-CM

## 2017-12-26 DIAGNOSIS — Z11.59 NEED FOR HEPATITIS C SCREENING TEST: ICD-10-CM

## 2017-12-26 DIAGNOSIS — M25.561 CHRONIC PAIN OF BOTH KNEES: ICD-10-CM

## 2017-12-26 DIAGNOSIS — M25.562 CHRONIC PAIN OF BOTH KNEES: ICD-10-CM

## 2017-12-26 DIAGNOSIS — R00.2 PALPITATIONS: ICD-10-CM

## 2017-12-26 DIAGNOSIS — Z12.11 SCREEN FOR COLON CANCER: ICD-10-CM

## 2017-12-26 DIAGNOSIS — F10.10 ETOH ABUSE: ICD-10-CM

## 2017-12-26 DIAGNOSIS — G89.29 CHRONIC PAIN OF BOTH KNEES: ICD-10-CM

## 2017-12-26 DIAGNOSIS — F17.200 SMOKING: ICD-10-CM

## 2017-12-26 DIAGNOSIS — K76.9 LIVER DISEASE: ICD-10-CM

## 2017-12-26 DIAGNOSIS — Z23 NEED FOR VACCINATION: ICD-10-CM

## 2017-12-26 PROBLEM — F10.939 ALCOHOL WITHDRAWAL (HCC): Status: RESOLVED | Noted: 2017-09-08 | Resolved: 2017-12-26

## 2017-12-26 PROBLEM — Z59.00 HOMELESSNESS: Status: RESOLVED | Noted: 2017-11-27 | Resolved: 2017-12-26

## 2017-12-26 PROCEDURE — 99204 OFFICE O/P NEW MOD 45 MIN: CPT | Mod: 25 | Performed by: INTERNAL MEDICINE

## 2017-12-26 PROCEDURE — G0009 ADMIN PNEUMOCOCCAL VACCINE: HCPCS | Performed by: INTERNAL MEDICINE

## 2017-12-26 PROCEDURE — 90670 PCV13 VACCINE IM: CPT | Performed by: INTERNAL MEDICINE

## 2017-12-26 RX ORDER — NAPROXEN 500 MG/1
500 TABLET ORAL 2 TIMES DAILY WITH MEALS
COMMUNITY
End: 2017-12-26

## 2017-12-26 RX ORDER — CLOTRIMAZOLE AND BETAMETHASONE DIPROPIONATE 10; .64 MG/G; MG/G
1 CREAM TOPICAL 2 TIMES DAILY
Qty: 30 G | Refills: 1 | Status: SHIPPED | OUTPATIENT
Start: 2017-12-26

## 2017-12-26 NOTE — PROGRESS NOTES
CC: Establishing care multiple issues.    HPI:   Chepe presents today with the following.    1. Palpitations  Presents after hospital visits with what he describes palpitations leading to dizziness. He has fainted on one or 2 occasions. Does not have chest pain did have an echocardiogram with a good EF.    2. Syncope, unspecified syncope type  Again was evaluated in the hospital with carotids and echocardiogram. Denying any current chest pains but has had episodes of palpitations without passing out since hospitalization. He denies any shortness of breath and no leg swelling.    3. Chronic pain of both knees  Reports chronic arthritis both knees would like to see an orthopedist in the future.    4. ETOH abuse  States he still drinks but has cut down significantly. No blood in stool or black stool.    5. Liver disease  Listed in his chart his liver disease do not see any major abnormalities on testing previously.      Family History   Problem Relation Age of Onset   • Cancer Mother    • Alcohol/Drug Father    • Cancer Brother          Patient Active Problem List    Diagnosis Date Noted   • Syncope and collapse 11/27/2017     Priority: High   • Liver disease 09/07/2017     Priority: Medium   • Smoking 09/07/2017     Priority: Low   • ETOH abuse 11/27/2017       Current Outpatient Prescriptions   Medication Sig Dispense Refill   • clotrimazole-betamethasone (LOTRISONE) 1-0.05 % Cream Apply 1 Application to affected area(s) 2 times a day. 30 g 1   • multivitamin (THERAGRAN) Tab Take 1 Tab by mouth every day. 30 Tab 1   • thiamine (THIAMINE) 100 MG tablet Take 1 Tab by mouth every day. 30 Tab 1   • folic acid (FOLVITE) 1 MG Tab Take 1 Tab by mouth every day. 30 Tab 1   • cyanocobalamin (VITAMIN B12) 1000 MCG Tab Take 1 Tab by mouth every day. 30 Tab 1   • albuterol 108 (90 Base) MCG/ACT Aero Soln inhalation aerosol Inhale 2 Puffs by mouth every 6 hours as needed for Shortness of Breath. (Patient not taking: Reported on  12/26/2017) 8.5 g 3     No current facility-administered medications for this visit.          Allergies as of 12/26/2017 - Reviewed 12/26/2017   Allergen Reaction Noted   • Codeine Hives 08/06/2008        ROS: As per HPI.    /78   Pulse (!) 57   Temp 36.8 °C (98.2 °F)   Resp 16   Ht 1.829 m (6')   Wt 75.2 kg (165 lb 12.8 oz)   SpO2 98%   BMI 22.49 kg/m²     Physical Exam:  Gen:         Alert and oriented, No apparent distress.  Neck:        No Lymphadenopathy or Bruits.  Lungs:     Clear to auscultation bilaterally  CV:          Regular rate and rhythm. No murmurs, rubs or gallops.               Ext:          No clubbing, cyanosis, edema.      Assessment and Plan.   67 y.o. male with the following issues.    1. Palpitations  Have written for Holter monitor referred to cardiology.  - REFERRAL TO CARDIOLOGY  - HOLTER MONITOR / EVENT RECORDER; Future    2. Syncope, unspecified syncope type  Workup of heart as above recommendation of cessation for alcohol.    3. Chronic pain of both knees  We'll get orthopedics when he is ready.    4. ETOH abuse  Recommendations given for cessation.  - CBC WITH DIFFERENTIAL; Future    5. Liver disease  Checking blood work  - PROTHROMBIN TIME; Future    6. Smoking  Recommendations for quitting.    7. Need for hepatitis C screening test  - HEP C VIRUS ANTIBODY; Future    8. Screening for cardiovascular condition    - COMP METABOLIC PANEL; Future  - LIPID PROFILE; Future    9. Need for vaccination    - PNEUMOCOCCAL CONJUGATE VACCINE 13-VALENT    10. Screen for colon cancer    - OCCULT BLOOD FECES IMMUNOASSAY; Future

## 2017-12-28 ENCOUNTER — HOSPITAL ENCOUNTER (OUTPATIENT)
Facility: MEDICAL CENTER | Age: 67
End: 2017-12-28
Attending: INTERNAL MEDICINE
Payer: MEDICARE

## 2017-12-28 PROCEDURE — 82274 ASSAY TEST FOR BLOOD FECAL: CPT

## 2018-01-04 DIAGNOSIS — Z12.11 SCREEN FOR COLON CANCER: ICD-10-CM

## 2018-01-05 LAB — HEMOCCULT STL QL IA: NEGATIVE

## 2018-01-31 ENCOUNTER — NON-PROVIDER VISIT (OUTPATIENT)
Dept: CARDIOLOGY | Facility: MEDICAL CENTER | Age: 68
End: 2018-01-31
Payer: MEDICARE

## 2018-01-31 DIAGNOSIS — Z09 HOSPITAL DISCHARGE FOLLOW-UP: ICD-10-CM

## 2018-01-31 DIAGNOSIS — R00.0 SINUS TACHYCARDIA: ICD-10-CM

## 2018-01-31 DIAGNOSIS — I49.1 PREMATURE ATRIAL CONTRACTION: ICD-10-CM

## 2018-01-31 DIAGNOSIS — R55 SYNCOPE AND COLLAPSE: ICD-10-CM

## 2018-01-31 DIAGNOSIS — R00.2 PALPITATIONS: ICD-10-CM

## 2018-01-31 DIAGNOSIS — I49.3 PVC (PREMATURE VENTRICULAR CONTRACTION): ICD-10-CM

## 2018-02-07 LAB — EKG IMPRESSION: NORMAL

## 2018-02-07 PROCEDURE — 93224 XTRNL ECG REC UP TO 48 HRS: CPT | Performed by: INTERNAL MEDICINE

## 2018-02-09 ENCOUNTER — TELEPHONE (OUTPATIENT)
Dept: MEDICAL GROUP | Facility: CLINIC | Age: 68
End: 2018-02-09

## 2018-02-09 NOTE — TELEPHONE ENCOUNTER
----- Message from TARIK Liriano sent at 2/8/2018  3:19 PM PST -----  Result reviewed. Please call patient to let her know that her holter monitoring result shows tachycardia when she has episode of dizziness but also has episodes of bradycardia. Her heart rate alternates between slow and fast heart rates with some premature heart beat. Cardiologist visit is recommended. I saw that the referral is in place already and the status is showing in the process but not yet ready to schedule. She should get the phone call or letter soon when it is ready to schedule.     Thanks.  RAMESH (Rita), APRN

## 2018-02-09 NOTE — TELEPHONE ENCOUNTER
Called patient and informed of provider's message below regarding holter monitor results.  Outcome: Patient voiced understanding of results and agreed to provider's recommendations to follow up with Cardiology.  Patient informed there is a referral placed by Dr Gavin for Cardiology consultation.  Patient voiced understanding and will call to schedule appointment at his convenience.

## 2018-02-26 ENCOUNTER — TELEPHONE (OUTPATIENT)
Dept: CARDIOLOGY | Facility: MEDICAL CENTER | Age: 68
End: 2018-02-26

## 2018-02-26 NOTE — TELEPHONE ENCOUNTER
Unable to leave message for pt in regards to NP appt with [facility] on 3/2/18. Will try to call at another time.

## 2018-03-02 ENCOUNTER — OFFICE VISIT (OUTPATIENT)
Dept: CARDIOLOGY | Facility: MEDICAL CENTER | Age: 68
End: 2018-03-02
Payer: MEDICARE

## 2018-03-02 VITALS
HEIGHT: 72 IN | HEART RATE: 82 BPM | DIASTOLIC BLOOD PRESSURE: 60 MMHG | BODY MASS INDEX: 21.67 KG/M2 | SYSTOLIC BLOOD PRESSURE: 100 MMHG | OXYGEN SATURATION: 95 % | WEIGHT: 160 LBS

## 2018-03-02 DIAGNOSIS — R55 SYNCOPE AND COLLAPSE: ICD-10-CM

## 2018-03-02 PROCEDURE — 99204 OFFICE O/P NEW MOD 45 MIN: CPT | Performed by: INTERNAL MEDICINE

## 2018-03-02 ASSESSMENT — ENCOUNTER SYMPTOMS
SPEECH CHANGE: 0
BLOOD IN STOOL: 0
WEIGHT LOSS: 0
NERVOUS/ANXIOUS: 0
MYALGIAS: 0
DEPRESSION: 0
SHORTNESS OF BREATH: 0
DIZZINESS: 1
PND: 0
COUGH: 0
EYES NEGATIVE: 1
SEIZURES: 0
ABDOMINAL PAIN: 0
FOCAL WEAKNESS: 0
HEARTBURN: 0

## 2018-03-03 NOTE — PROGRESS NOTES
Subjective:   Chepe Persaud is a 67 y.o. male who presents today   As a new patient. He was sent in consultation by Dr. Gavin in regards to his abnormal Holter monitor in the setting of palpitations    Poor historian, daughter and waiting room but not interested in coming in    Admits to being born and raised in [facility]. Lived in [place] for many years. Heavy drinker, admits this is trying to only drink 2 glasses of wine a day.  Dizzy and lightheaded. Positive orthostatics in the hospital  Not good at eating or drinking water    Still gets dizzy, here to see if his heart is causing the problems    Past Medical History:   Diagnosis Date   • Liver disease     HEP C+     Past Surgical History:   Procedure Laterality Date   • PATELLA ORIF  1/17/2009    Performed by COMPA MURILLO at [facility] ORS     Family History   Problem Relation Age of Onset   • Cancer Mother    • Alcohol/Drug Father    • Cancer Brother      History   Smoking Status   • Current Every Day Smoker   • Packs/day: 0.25   • Types: Cigarettes   Smokeless Tobacco   • Never Used     Comment: pk/day     Allergies   Allergen Reactions   • Codeine Hives     Outpatient Encounter Prescriptions as of 3/2/2018   Medication Sig Dispense Refill   • folic acid (FOLVITE) 1 MG Tab Take 1 Tab by mouth every day. 30 Tab 1   • cyanocobalamin (VITAMIN B12) 1000 MCG Tab Take 1 Tab by mouth every day. 30 Tab 1   • clotrimazole-betamethasone (LOTRISONE) 1-0.05 % Cream Apply 1 Application to affected area(s) 2 times a day. (Patient not taking: Reported on 3/2/2018) 30 g 1   • albuterol 108 (90 Base) MCG/ACT Aero Soln inhalation aerosol Inhale 2 Puffs by mouth every 6 hours as needed for Shortness of Breath. (Patient not taking: Reported on 12/26/2017) 8.5 g 3   • multivitamin (THERAGRAN) Tab Take 1 Tab by mouth every day. (Patient not taking: Reported on 3/2/2018) 30 Tab 1   • thiamine (THIAMINE) 100 MG tablet Take 1 Tab by mouth every day.  (Patient not taking: Reported on 3/2/2018) 30 Tab 1     No facility-administered encounter medications on file as of 3/2/2018.      Review of Systems   Constitutional: Negative for malaise/fatigue and weight loss.   HENT: Negative for hearing loss and tinnitus.    Eyes: Negative.    Respiratory: Negative for cough and shortness of breath.    Cardiovascular: Negative for chest pain, leg swelling and PND.   Gastrointestinal: Negative for abdominal pain, blood in stool, heartburn and melena.   Genitourinary: Negative for dysuria.   Musculoskeletal: Negative for joint pain and myalgias.   Neurological: Positive for dizziness. Negative for speech change, focal weakness and seizures.   Psychiatric/Behavioral: Negative for depression. The patient is not nervous/anxious.    All other systems reviewed and are negative.       Objective:   /60   Pulse 82   Ht 1.829 m (6')   Wt 72.6 kg (160 lb)   SpO2 95%   BMI 21.70 kg/m²     Physical Exam   Constitutional: He is oriented to person, place, and time.   Thin, older than stated age no icterus   HENT:   Head: Normocephalic and atraumatic.   Eyes: EOM are normal. Pupils are equal, round, and reactive to light.   Neck: No JVD present. No thyromegaly present.   Cardiovascular: Normal rate, regular rhythm and intact distal pulses.  Exam reveals no friction rub.    No murmur heard.  Pulmonary/Chest: Breath sounds normal. He exhibits no tenderness.   Abdominal: Bowel sounds are normal. He exhibits no distension.   Musculoskeletal: He exhibits no edema or tenderness.   Lymphadenopathy:     He has no cervical adenopathy.   Neurological: He is alert and oriented to person, place, and time. He exhibits normal muscle tone. Coordination normal.   Skin: Skin is warm and dry. No rash noted.   Psychiatric: He has a normal mood and affect. His behavior is normal.       Assessment:     1. Syncope and collapse         Medical Decision Making:  Today's Assessment / Status / Plan:        Reviewed his extensive testing for his 2 recent hospital stays. Normal echocardiogram, Holter monitor reviewed which only shows mild sinus tachycardia that looks appropriate. No significant blocks or arrhythmia    Reassurance given  Talked at length about cessation of alcohol  Would be reasonable to drink 2 cups of broth daily and certainly hydration will help    Will follow-up with him in a month or 2 to see how he is doing with this plan  No indication for medical therapy or an ischemic workup at this point    Questions answered, spent more than 30 minutes about physiology at length with a gentleman who is quite smart and grasps his disease state

## 2018-06-08 ENCOUNTER — OFFICE VISIT (OUTPATIENT)
Dept: MEDICAL GROUP | Facility: MEDICAL CENTER | Age: 68
End: 2018-06-08
Payer: MEDICARE

## 2018-06-08 VITALS
RESPIRATION RATE: 16 BRPM | HEART RATE: 66 BPM | BODY MASS INDEX: 22.28 KG/M2 | SYSTOLIC BLOOD PRESSURE: 100 MMHG | HEIGHT: 72 IN | DIASTOLIC BLOOD PRESSURE: 68 MMHG | OXYGEN SATURATION: 97 % | TEMPERATURE: 99.5 F | WEIGHT: 164.46 LBS

## 2018-06-08 DIAGNOSIS — N48.9 LESION OF PENIS: ICD-10-CM

## 2018-06-08 PROCEDURE — 99214 OFFICE O/P EST MOD 30 MIN: CPT | Performed by: FAMILY MEDICINE

## 2018-06-10 NOTE — PROGRESS NOTES
CC: Possible syphilis ( primary)    HPI:   Chepe is 68 yo male patient, Dr Romaine's patient, presents today to be cleared to have sex. Patient was seen at [facility] for a 2 lesions on the side of the shaft his penis( now healed), patient stated that they were painless, and has a lot of white discharge . As per patient he was tested for syphilis and was found positive, was given an an IM injection ( probably benzathine Penicillin). However no record was sent from [facility]. Currently patient is asymptomatic.      Patient Active Problem List    Diagnosis Date Noted   • Syncope and collapse 11/27/2017     Priority: High   • Liver disease 09/07/2017     Priority: Medium   • Smoking 09/07/2017     Priority: Low   • ETOH abuse 11/27/2017       Current Outpatient Prescriptions   Medication Sig Dispense Refill   • clotrimazole-betamethasone (LOTRISONE) 1-0.05 % Cream Apply 1 Application to affected area(s) 2 times a day. (Patient not taking: Reported on 3/2/2018) 30 g 1   • albuterol 108 (90 Base) MCG/ACT Aero Soln inhalation aerosol Inhale 2 Puffs by mouth every 6 hours as needed for Shortness of Breath. (Patient not taking: Reported on 12/26/2017) 8.5 g 3   • multivitamin (THERAGRAN) Tab Take 1 Tab by mouth every day. (Patient not taking: Reported on 3/2/2018) 30 Tab 1   • thiamine (THIAMINE) 100 MG tablet Take 1 Tab by mouth every day. (Patient not taking: Reported on 3/2/2018) 30 Tab 1   • folic acid (FOLVITE) 1 MG Tab Take 1 Tab by mouth every day. 30 Tab 1   • cyanocobalamin (VITAMIN B12) 1000 MCG Tab Take 1 Tab by mouth every day. 30 Tab 1     No current facility-administered medications for this visit.          Allergies as of 06/08/2018 - Reviewed 06/08/2018   Allergen Reaction Noted   • Codeine Hives 08/06/2008        ROS: Denies any chest pain, Shortness of breath, Changes bowel or bladder, Lower extremity edema.    Physical Exam:  /68   Pulse 66   Temp 37.5 °C (99.5 °F)   Resp 16   Ht  1.829 m (6')   Wt 74.6 kg (164 lb 7.4 oz)   SpO2 97%   BMI 22.31 kg/m²   Gen.: Well-developed, well-nourished, no apparent distress,pleasant and cooperative with the examination  Skin:  Warm and dry with good turgor.Two healed lesions on the side of the shaft of the penis.  ; no inguinal adenopathy, no urethral discharge        Assessment and Plan.   67 y.o. male     1. Lesion of penis  ? Syphilitic chancre  Needs record form [facility], If RPR is positive ( non treponemal ) need to be confirmed with treponemal testing .  Patient can not be cleared , till tested with treponemal testing, if negative probably RPR was false positive, if positive patient need to be treated.  Follow up with his PCP/ or me after getting the record from [facility].

## 2018-06-12 ENCOUNTER — OFFICE VISIT (OUTPATIENT)
Dept: MEDICAL GROUP | Facility: MEDICAL CENTER | Age: 68
End: 2018-06-12
Payer: MEDICARE

## 2018-06-12 ENCOUNTER — HOSPITAL ENCOUNTER (OUTPATIENT)
Dept: LAB | Facility: MEDICAL CENTER | Age: 68
End: 2018-06-12
Attending: FAMILY MEDICINE
Payer: MEDICARE

## 2018-06-12 VITALS
OXYGEN SATURATION: 96 % | HEART RATE: 54 BPM | DIASTOLIC BLOOD PRESSURE: 60 MMHG | WEIGHT: 163.14 LBS | RESPIRATION RATE: 16 BRPM | BODY MASS INDEX: 22.1 KG/M2 | TEMPERATURE: 99 F | SYSTOLIC BLOOD PRESSURE: 110 MMHG | HEIGHT: 72 IN

## 2018-06-12 DIAGNOSIS — A53.0 POSITIVE RPR TEST: ICD-10-CM

## 2018-06-12 LAB
RPR SER QL: REACTIVE
RPR SER-TITR: NORMAL {TITER}
TREPONEMA PALLIDUM IGG+IGM AB [PRESENCE] IN SERUM OR PLASMA BY IMMUNOASSAY: REACTIVE

## 2018-06-12 PROCEDURE — 86780 TREPONEMA PALLIDUM: CPT | Mod: GA,91

## 2018-06-12 PROCEDURE — 99214 OFFICE O/P EST MOD 30 MIN: CPT | Performed by: FAMILY MEDICINE

## 2018-06-12 PROCEDURE — 36415 COLL VENOUS BLD VENIPUNCTURE: CPT | Mod: GA

## 2018-06-12 PROCEDURE — 86593 SYPHILIS TEST NON-TREP QUANT: CPT

## 2018-06-12 PROCEDURE — 86592 SYPHILIS TEST NON-TREP QUAL: CPT

## 2018-06-12 RX ORDER — MECLIZINE HYDROCHLORIDE 25 MG/1
25 TABLET ORAL 3 TIMES DAILY PRN
COMMUNITY

## 2018-06-12 RX ORDER — OMEPRAZOLE 20 MG/1
20 CAPSULE, DELAYED RELEASE ORAL DAILY
COMMUNITY

## 2018-06-12 NOTE — PROGRESS NOTES
CC: Positve RPR, Penial lesions( healed)    HPI:   Chepe presents today  to be cleared to have sex. Patient was seen at [facility] for a 2 lesions on the side of the shaft his penis( now healed), patient stated that they were painless, and has a lot of white discharge . Records form [facility] showed patient was tested for syphilis , the non treponemal test (RPR) and was found positive, was given an an IM injection ( probably benzathine Penicillin). He is negative for other STDs ( Chlamydia, Gonorrhea, sand HIV).        Patient Active Problem List    Diagnosis Date Noted   • Syncope and collapse 11/27/2017     Priority: High   • Liver disease 09/07/2017     Priority: Medium   • Smoking 09/07/2017     Priority: Low   • ETOH abuse 11/27/2017       Current Outpatient Prescriptions   Medication Sig Dispense Refill   • meclizine (ANTIVERT) 25 MG Tab Take 25 mg by mouth 3 times a day as needed.     • omeprazole (PRILOSEC) 20 MG delayed-release capsule Take 20 mg by mouth every day.     • folic acid (FOLVITE) 1 MG Tab Take 1 Tab by mouth every day. 30 Tab 1   • cyanocobalamin (VITAMIN B12) 1000 MCG Tab Take 1 Tab by mouth every day. 30 Tab 1   • clotrimazole-betamethasone (LOTRISONE) 1-0.05 % Cream Apply 1 Application to affected area(s) 2 times a day. (Patient not taking: Reported on 3/2/2018) 30 g 1   • albuterol 108 (90 Base) MCG/ACT Aero Soln inhalation aerosol Inhale 2 Puffs by mouth every 6 hours as needed for Shortness of Breath. (Patient not taking: Reported on 12/26/2017) 8.5 g 3   • multivitamin (THERAGRAN) Tab Take 1 Tab by mouth every day. (Patient not taking: Reported on 3/2/2018) 30 Tab 1   • thiamine (THIAMINE) 100 MG tablet Take 1 Tab by mouth every day. (Patient not taking: Reported on 3/2/2018) 30 Tab 1     No current facility-administered medications for this visit.          Allergies as of 06/12/2018 - Reviewed 06/12/2018   Allergen Reaction Noted   • Codeine Hives 08/06/2008        ROS:  Denies any chest pain, Shortness of breath, Changes bowel or bladder, Lower extremity edema.    Physical Exam:  /60   Pulse (!) 54   Temp 37.2 °C (99 °F)   Resp 16   Ht 1.829 m (6')   Wt 74 kg (163 lb 2.3 oz)   SpO2 96%   BMI 22.13 kg/m²   Gen.: Well-developed, well-nourished, no apparent distress,pleasant and cooperative with the examination  Skin:  Warm and dry with good turgor. Two healed lesions on the penial shaft.        Assessment and Plan.   67 y.o. male     1. Positive RPR test  S/P treatment with single dose of Benzathine Penicillin,   However will check the Treponemal testing for confirmation.If negative probably RPR is false positive, also if it is negative there is a possibility of that patient is treated ( already given the penicillin injection, however that is a low possibility).  However if Treponemal tests are negative, I recommend patient to be seen by ID.    - T.PALLIDUM AB EIA; Future  - ANTIBODY,TREPONEMA PALLIDUM; Future

## 2018-06-13 DIAGNOSIS — A53.9 SYPHILIS: ICD-10-CM

## 2018-06-15 LAB — T PALLIDUM AB SER QL AGGL: NON REACTIVE

## 2018-06-19 ENCOUNTER — OFFICE VISIT (OUTPATIENT)
Dept: MEDICAL GROUP | Facility: MEDICAL CENTER | Age: 68
End: 2018-06-19
Payer: MEDICARE

## 2018-06-19 VITALS
OXYGEN SATURATION: 91 % | RESPIRATION RATE: 16 BRPM | WEIGHT: 164 LBS | HEIGHT: 72 IN | TEMPERATURE: 98.4 F | DIASTOLIC BLOOD PRESSURE: 64 MMHG | BODY MASS INDEX: 22.21 KG/M2 | SYSTOLIC BLOOD PRESSURE: 102 MMHG | HEART RATE: 57 BPM

## 2018-06-19 DIAGNOSIS — A53.9 SYPHILIS: ICD-10-CM

## 2018-06-19 DIAGNOSIS — Z11.59 NEED FOR HEPATITIS C SCREENING TEST: ICD-10-CM

## 2018-06-19 PROCEDURE — 99213 OFFICE O/P EST LOW 20 MIN: CPT | Performed by: INTERNAL MEDICINE

## 2018-06-19 NOTE — PROGRESS NOTES
CC: Follow-up syphilis.    HPI:   Chepe presents today with the following.    1. Syphilis  Presents after apparently being treated for syphilis and outside agency.  He reports the pedal lesions have resolved.  He did have blood work showing a positive RPR.  Denies any other complaints today.  He cannot recall if he went and saw infectious disease although referral has been placed.      Patient from the beginning of the visit was quite brooding.  Discussion he was quite upset about the shot he got the last time causing him to have a day of body aches.  He does report that he requested to see another provider and did not want to see me today.  Discussion about what he was upset about he states that he also had a colon fit test 6 months ago that was normal and he was upset that he was never informed.  He did miss his scheduled appointment in February.  He did not complete blood work that we ordered last visit.      Patient Active Problem List    Diagnosis Date Noted   • Syncope and collapse 11/27/2017     Priority: High   • Liver disease 09/07/2017     Priority: Medium   • Smoking 09/07/2017     Priority: Low   • ETOH abuse 11/27/2017       Current Outpatient Prescriptions   Medication Sig Dispense Refill   • NON SPECIFIED Boost protein drink.   1 drink daily 30 Each 6   • meclizine (ANTIVERT) 25 MG Tab Take 25 mg by mouth 3 times a day as needed.     • omeprazole (PRILOSEC) 20 MG delayed-release capsule Take 20 mg by mouth every day.     • albuterol 108 (90 Base) MCG/ACT Aero Soln inhalation aerosol Inhale 2 Puffs by mouth every 6 hours as needed for Shortness of Breath. 8.5 g 3   • multivitamin (THERAGRAN) Tab Take 1 Tab by mouth every day. 30 Tab 1   • thiamine (THIAMINE) 100 MG tablet Take 1 Tab by mouth every day. 30 Tab 1   • folic acid (FOLVITE) 1 MG Tab Take 1 Tab by mouth every day. 30 Tab 1   • cyanocobalamin (VITAMIN B12) 1000 MCG Tab Take 1 Tab by mouth every day. 30 Tab 1   • clotrimazole-betamethasone  (LOTRISONE) 1-0.05 % Cream Apply 1 Application to affected area(s) 2 times a day. (Patient not taking: Reported on 3/2/2018) 30 g 1     No current facility-administered medications for this visit.          Allergies as of 06/19/2018 - Reviewed 06/19/2018   Allergen Reaction Noted   • Codeine Hives 08/06/2008        ROS: Denies Chest pain, SOB, LE edema.    /64   Pulse (!) 57   Temp 36.9 °C (98.4 °F)   Resp 16   Ht 1.829 m (6')   Wt 74.4 kg (164 lb)   SpO2 91%   BMI 22.24 kg/m²     Physical Exam:  Gen:         Alert and oriented, No apparent distress.  Neck:        No Lymphadenopathy or Bruits.  Lungs:     Clear to auscultation bilaterally  CV:          Regular rate and rhythm. No murmurs, rubs or gallops.               Ext:          No clubbing, cyanosis, edema.      Assessment and Plan.   67 y.o. male with the following issues.    1. Syphilis  Have asked to complete RPR titers in approximately 1 month he will follow along with infectious disease.  - RPR TITER+TP-PA REFLEX      Patient was not overtly threatening however felt as unsafe with the patient in the room alone as I have in the last 10 years.

## 2021-01-15 DIAGNOSIS — Z23 NEED FOR VACCINATION: ICD-10-CM

## 2023-10-08 ENCOUNTER — APPOINTMENT (OUTPATIENT)
Dept: RADIOLOGY | Facility: MEDICAL CENTER | Age: 73
End: 2023-10-08
Attending: EMERGENCY MEDICINE
Payer: MEDICARE

## 2023-10-08 ENCOUNTER — HOSPITAL ENCOUNTER (EMERGENCY)
Facility: MEDICAL CENTER | Age: 73
End: 2023-10-08
Attending: EMERGENCY MEDICINE
Payer: MEDICARE

## 2023-10-08 VITALS
HEART RATE: 66 BPM | OXYGEN SATURATION: 95 % | DIASTOLIC BLOOD PRESSURE: 92 MMHG | TEMPERATURE: 98.6 F | RESPIRATION RATE: 17 BRPM | BODY MASS INDEX: 21.26 KG/M2 | HEIGHT: 72 IN | WEIGHT: 156.97 LBS | SYSTOLIC BLOOD PRESSURE: 125 MMHG

## 2023-10-08 DIAGNOSIS — R10.9 ABDOMINAL PAIN, UNSPECIFIED ABDOMINAL LOCATION: ICD-10-CM

## 2023-10-08 DIAGNOSIS — R10.9 FLANK PAIN: ICD-10-CM

## 2023-10-08 DIAGNOSIS — R35.0 URINARY FREQUENCY: ICD-10-CM

## 2023-10-08 LAB
ALBUMIN SERPL BCP-MCNC: 4.1 G/DL (ref 3.2–4.9)
ALBUMIN/GLOB SERPL: 1.5 G/DL
ALP SERPL-CCNC: 62 U/L (ref 30–99)
ALT SERPL-CCNC: 24 U/L (ref 2–50)
ANION GAP SERPL CALC-SCNC: 12 MMOL/L (ref 7–16)
APPEARANCE UR: CLEAR
AST SERPL-CCNC: 33 U/L (ref 12–45)
BASOPHILS # BLD AUTO: 0 % (ref 0–1.8)
BASOPHILS # BLD: 0 K/UL (ref 0–0.12)
BILIRUB SERPL-MCNC: 0.4 MG/DL (ref 0.1–1.5)
BILIRUB UR QL STRIP.AUTO: NEGATIVE
BUN SERPL-MCNC: 7 MG/DL (ref 8–22)
CALCIUM ALBUM COR SERPL-MCNC: 8.7 MG/DL (ref 8.5–10.5)
CALCIUM SERPL-MCNC: 8.8 MG/DL (ref 8.5–10.5)
CHLORIDE SERPL-SCNC: 107 MMOL/L (ref 96–112)
CO2 SERPL-SCNC: 22 MMOL/L (ref 20–33)
COLOR UR: YELLOW
COMMENT NL1176: NORMAL
CREAT SERPL-MCNC: 0.79 MG/DL (ref 0.5–1.4)
EOSINOPHIL # BLD AUTO: 0.05 K/UL (ref 0–0.51)
EOSINOPHIL NFR BLD: 0.9 % (ref 0–6.9)
ERYTHROCYTE [DISTWIDTH] IN BLOOD BY AUTOMATED COUNT: 42.8 FL (ref 35.9–50)
GFR SERPLBLD CREATININE-BSD FMLA CKD-EPI: 94 ML/MIN/1.73 M 2
GLOBULIN SER CALC-MCNC: 2.8 G/DL (ref 1.9–3.5)
GLUCOSE SERPL-MCNC: 95 MG/DL (ref 65–99)
GLUCOSE UR STRIP.AUTO-MCNC: NEGATIVE MG/DL
HCT VFR BLD AUTO: 44.9 % (ref 42–52)
HGB BLD-MCNC: 14.9 G/DL (ref 14–18)
KETONES UR STRIP.AUTO-MCNC: NEGATIVE MG/DL
LEUKOCYTE ESTERASE UR QL STRIP.AUTO: NEGATIVE
LIPASE SERPL-CCNC: 21 U/L (ref 11–82)
LYMPHOCYTES # BLD AUTO: 1.55 K/UL (ref 1–4.8)
LYMPHOCYTES NFR BLD: 25.9 % (ref 22–41)
MANUAL DIFF BLD: NORMAL
MCH RBC QN AUTO: 31.2 PG (ref 27–33)
MCHC RBC AUTO-ENTMCNC: 33.2 G/DL (ref 32.3–36.5)
MCV RBC AUTO: 94.1 FL (ref 81.4–97.8)
MICRO URNS: NORMAL
MONOCYTES # BLD AUTO: 0.52 K/UL (ref 0–0.85)
MONOCYTES NFR BLD AUTO: 8.6 % (ref 0–13.4)
MORPHOLOGY BLD-IMP: NORMAL
NEUTROPHILS # BLD AUTO: 3.88 K/UL (ref 1.82–7.42)
NEUTROPHILS NFR BLD: 64.6 % (ref 44–72)
NITRITE UR QL STRIP.AUTO: NEGATIVE
NRBC # BLD AUTO: 0 K/UL
NRBC BLD-RTO: 0 /100 WBC (ref 0–0.2)
PH UR STRIP.AUTO: 6 [PH] (ref 5–8)
PLATELET # BLD AUTO: 160 K/UL (ref 164–446)
PLATELET BLD QL SMEAR: NORMAL
PMV BLD AUTO: 11.2 FL (ref 9–12.9)
POTASSIUM SERPL-SCNC: 4.2 MMOL/L (ref 3.6–5.5)
PROT SERPL-MCNC: 6.9 G/DL (ref 6–8.2)
PROT UR QL STRIP: NEGATIVE MG/DL
RBC # BLD AUTO: 4.77 M/UL (ref 4.7–6.1)
RBC BLD AUTO: NORMAL
RBC UR QL AUTO: NEGATIVE
SODIUM SERPL-SCNC: 141 MMOL/L (ref 135–145)
SP GR UR STRIP.AUTO: 1.01
UROBILINOGEN UR STRIP.AUTO-MCNC: 1 MG/DL
WBC # BLD AUTO: 6 K/UL (ref 4.8–10.8)

## 2023-10-08 PROCEDURE — 81003 URINALYSIS AUTO W/O SCOPE: CPT

## 2023-10-08 PROCEDURE — 85007 BL SMEAR W/DIFF WBC COUNT: CPT

## 2023-10-08 PROCEDURE — 700117 HCHG RX CONTRAST REV CODE 255: Performed by: EMERGENCY MEDICINE

## 2023-10-08 PROCEDURE — 99284 EMERGENCY DEPT VISIT MOD MDM: CPT

## 2023-10-08 PROCEDURE — 83690 ASSAY OF LIPASE: CPT

## 2023-10-08 PROCEDURE — 74177 CT ABD & PELVIS W/CONTRAST: CPT

## 2023-10-08 PROCEDURE — 85025 COMPLETE CBC W/AUTO DIFF WBC: CPT

## 2023-10-08 PROCEDURE — 36415 COLL VENOUS BLD VENIPUNCTURE: CPT

## 2023-10-08 PROCEDURE — 80053 COMPREHEN METABOLIC PANEL: CPT

## 2023-10-08 RX ORDER — NAPROXEN 500 MG/1
500 TABLET ORAL 2 TIMES DAILY WITH MEALS
Qty: 20 TABLET | Refills: 0 | Status: SHIPPED | OUTPATIENT
Start: 2023-10-08

## 2023-10-08 RX ADMIN — IOHEXOL 100 ML: 350 INJECTION, SOLUTION INTRAVENOUS at 13:15

## 2023-10-08 ASSESSMENT — PAIN DESCRIPTION - PAIN TYPE: TYPE: ACUTE PAIN

## 2023-10-08 NOTE — ED TRIAGE NOTES
Chief Complaint   Patient presents with    Flank Pain     Patient reports right flank pain x 7 days. Patient reports he has been urinating every few hours.     Rectal Pain     Patient reports he has missed his colonoscopy x 4 times and is concerned in the change in his daily bowel movement and is having rectal pain.

## 2023-10-08 NOTE — ED NOTES
Bedside report received from off going RN Luzmaria, assumed care of patient.  POC discussed with patient. Call light within reach, all needs addressed at this time.       Fall risk interventions in place: Move the patient closer to the nurse's station, Patient's personal possessions are with in their safe reach, Place fall risk sign on patient's door, Give patient urinal if applicable, Keep floor surfaces clean and dry, and Accompanied to restroom (all applicable per [place] Fall risk assessment)   Continuous monitoring: Pulse Ox or Blood Pressure  IVF/IV medications: Not Applicable   Oxygen: Room Air  Bedside sitter: Not Applicable   Isolation: Not Applicable

## 2023-10-08 NOTE — ED PROVIDER NOTES
ED Provider Note    CHIEF COMPLAINT  Chief Complaint   Patient presents with    Flank Pain     Patient reports right flank pain x 7 days. Patient reports he has been urinating every few hours.     Rectal Pain     Patient reports he has missed his colonoscopy x 4 times and is concerned in the change in his daily bowel movement and is having rectal pain.         EXTERNAL RECORDS REVIEWED  3/21/2017, hepatitis C, hep otology visit in the [facility] from [facility]    HPI/ROS    Chepe Persaud is a 73 y.o. male who presents for evaluation of right-sided abdominal and flank pain as well as increased urinary frequency.  This is been going on for quite some time, the patient really cannot elicit how long exactly but states over the past week or so it is worsened significantly.  At times he feels pain that shoots from the right side to the left side of his abdomen.  No abdominal distention.  He has been experiencing decreasing appetite.  Patient denies any medical problems but a chart review reveals that he does have hepatitis with a history of alcohol and drug abuse.  The patient expresses concerns of the possibility of colon cancer, states that in the past he has had 3 colonoscopy scheduled but he never went to any of those appointments.  He reports that he is here in [place] possibly temporarily and has not sought any GI care at this time.  He has not been vomiting.  No known family history of colon cancer.  No abdominal surgeries.  No chest pain or shortness of breath.    PAST MEDICAL HISTORY   has a past medical history of Liver disease.    SURGICAL HISTORY   has a past surgical history that includes orif, patella (1/17/2009).    FAMILY HISTORY  Family History   Problem Relation Age of Onset    Cancer Mother     Alcohol/Drug Father     Cancer Brother        SOCIAL HISTORY  Social History     Tobacco Use    Smoking status: Every Day     Current packs/day: 0.25     Types: Cigarettes    Smokeless  tobacco: Never    Tobacco comments:     pk/day   Vaping Use    Vaping Use: Never used   Substance and Sexual Activity    Alcohol use: Yes     Comment: a couple of beers/day    Drug use: No    Sexual activity: Not on file       CURRENT MEDICATIONS  Home Medications       Reviewed by Sona Bates R.N. (Registered Nurse) on 10/08/23 at 1046  Med List Status: Partial     Medication Last Dose Status   albuterol 108 (90 Base) MCG/ACT Aero Soln inhalation aerosol  Active   clotrimazole-betamethasone (LOTRISONE) 1-0.05 % Cream  Active   cyanocobalamin (VITAMIN B12) 1000 MCG Tab  Active   folic acid (FOLVITE) 1 MG Tab  Active   meclizine (ANTIVERT) 25 MG Tab  Active   multivitamin (THERAGRAN) Tab  Active   NON SPECIFIED  Active   omeprazole (PRILOSEC) 20 MG delayed-release capsule  Active   thiamine (THIAMINE) 100 MG tablet  Active                    ALLERGIES  Allergies   Allergen Reactions    Codeine Hives       PHYSICAL EXAM  VITAL SIGNS: BP 95/62   Pulse 92   Temp 37.7 °C (99.8 °F) (Temporal)   Resp 18   Ht 1.829 m (6')   Wt 71.2 kg (156 lb 15.5 oz)   SpO2 97%   BMI 21.29 kg/m²    Constitutional: Chronically ill-appearing, awake, alert, not in acute distress.  HENT: Normocephalic, no obvious evidence of acute trauma.  Edentulous  Eyes: No scleral icterus. Normal conjunctiva   Thorax & Lungs: Normal nonlabored respirations. I appreciate no wheezing, rhonchi or rales. There is normal air movement.  Upon cardiac ascultation I appreciate a regular heart rhythm and a normal rate.   Abdomen: The abdomen is not visibly distended.  Upon palpation has right-sided tenderness, no rebound or guarding  Skin: The exposed portions of skin reveal no obvious rash or other abnormalities.  Extremities/Musculoskeletal: No obvious sign of acute trauma. No asymmetric calf tenderness or edema.   Neurologic: Alert & oriented. No focal deficits observed.      DIAGNOSTIC STUDIES / PROCEDURES    LABS  Results for orders placed or  performed during the hospital encounter of 10/08/23   Urinalysis, Culture if Indicated    Specimen: Urine, Clean Catch   Result Value Ref Range    Color Yellow     Character Clear     Specific Gravity 1.008 <1.035    Ph 6.0 5.0 - 8.0    Glucose Negative Negative mg/dL    Ketones Negative Negative mg/dL    Protein Negative Negative mg/dL    Bilirubin Negative Negative    Urobilinogen, Urine 1.0 Negative    Nitrite Negative Negative    Leukocyte Esterase Negative Negative    Occult Blood Negative Negative    Micro Urine Req see below    CBC WITH DIFFERENTIAL   Result Value Ref Range    WBC 6.0 4.8 - 10.8 K/uL    RBC 4.77 4.70 - 6.10 M/uL    Hemoglobin 14.9 14.0 - 18.0 g/dL    Hematocrit 44.9 42.0 - 52.0 %    MCV 94.1 81.4 - 97.8 fL    MCH 31.2 27.0 - 33.0 pg    MCHC 33.2 32.3 - 36.5 g/dL    RDW 42.8 35.9 - 50.0 fL    Platelet Count 160 (L) 164 - 446 K/uL    MPV 11.2 9.0 - 12.9 fL    Neutrophils-Polys 64.60 44.00 - 72.00 %    Lymphocytes 25.90 22.00 - 41.00 %    Monocytes 8.60 0.00 - 13.40 %    Eosinophils 0.90 0.00 - 6.90 %    Basophils 0.00 0.00 - 1.80 %    Nucleated RBC 0.00 0.00 - 0.20 /100 WBC    Neutrophils (Absolute) 3.88 1.82 - 7.42 K/uL    Lymphs (Absolute) 1.55 1.00 - 4.80 K/uL    Monos (Absolute) 0.52 0.00 - 0.85 K/uL    Eos (Absolute) 0.05 0.00 - 0.51 K/uL    Baso (Absolute) 0.00 0.00 - 0.12 K/uL    NRBC (Absolute) 0.00 K/uL   COMP METABOLIC PANEL   Result Value Ref Range    Sodium 141 135 - 145 mmol/L    Potassium 4.2 3.6 - 5.5 mmol/L    Chloride 107 96 - 112 mmol/L    Co2 22 20 - 33 mmol/L    Anion Gap 12.0 7.0 - 16.0    Glucose 95 65 - 99 mg/dL    Bun 7 (L) 8 - 22 mg/dL    Creatinine 0.79 0.50 - 1.40 mg/dL    Calcium 8.8 8.5 - 10.5 mg/dL    Correct Calcium 8.7 8.5 - 10.5 mg/dL    AST(SGOT) 33 12 - 45 U/L    ALT(SGPT) 24 2 - 50 U/L    Alkaline Phosphatase 62 30 - 99 U/L    Total Bilirubin 0.4 0.1 - 1.5 mg/dL    Albumin 4.1 3.2 - 4.9 g/dL    Total Protein 6.9 6.0 - 8.2 g/dL    Globulin 2.8 1.9 - 3.5 g/dL     A-G Ratio 1.5 g/dL   LIPASE   Result Value Ref Range    Lipase 21 11 - 82 U/L   ESTIMATED GFR   Result Value Ref Range    GFR (CKD-EPI) 94 >60 mL/min/1.73 m 2   DIFFERENTIAL MANUAL   Result Value Ref Range    Manual Diff Status PERFORMED     Comment See Comment    PERIPHERAL SMEAR REVIEW   Result Value Ref Range    Peripheral Smear Review see below    PLATELET ESTIMATE   Result Value Ref Range    Plt Estimation Decreased    MORPHOLOGY   Result Value Ref Range    RBC Morphology Normal         RADIOLOGY  I have independently interpreted the diagnostic imaging associated with this visit and am waiting the final reading from the radiologist.   My preliminary interpretation is as follows: No obstructive uropathy  Radiologist interpretation:   CT-ABDOMEN-PELVIS WITH   Final Result      1.  No evidence of bowel obstruction or focal inflammatory change.      2.  Atherosclerotic vascular calcification.      3.  Fatty change of the liver.      4.  No evidence of renal or ureteral stone or hydronephrosis.            COURSE & MEDICAL DECISION MAKING    ED Observation Status? No; Patient does not meet criteria for ED Observation.     INITIAL ASSESSMENT, COURSE AND PLAN  Care Narrative: 73-year-old male with a history of hepatitis comes in with right-sided abdominal and flank pain worsening over the past week with increased urinary frequency.  Has tenderness on exam but no indication of peritonitis.  He is chronically ill-appearing.  Blood work, urinalysis and CT will be obtained.  Additionally, he is requesting a colonoscopy, states that his last 3 scheduled colonoscopies he never showed up for, I have informed him that I can provide him with the contact information for outpatient gastroenterologist in [facility] although we have no outpatient gastroenterologist on-call for us and he expresses understanding  Differential includes: Dehydration, electro abnormalities, anemia,  biliary obstruction, obstructive uropathy,  pyelonephritis, hepatitis, pancreatitis    This work-up is actually quite reassuring.  Blood counts are within normal limits, electrolytes, renal and hepatic function are all normal.  Normal alkaline phosphatase and bilirubin.  Normal lipase.  Urinalysis is normal as well.  CT scan gives no indication of focal inflammatory changes, obstructive uropathy or other obvious acute pathology.  At this point, no clear urgent emergent etiology exist to explain his symptoms.  He will be prescribed naproxen.  He will be referred to gastroenterology and has been provided with the local groups in [facility] contact information.  He will follow-up with his PCP Dr. Gavin.  Discharged home in stable condition  Prescription for naproxen    DISPOSITION AND DISCUSSIONS    Escalation of care considered, and ultimately not performed: Considered escalation to inpatient management although with the reassuring work-up in the absence of obvious emergent pathology the patient is discharged to follow-up with a primary care provider.      FINAL DIAGNOSIS  1. Flank pain    2. Abdominal pain, unspecified abdominal location    3. Urinary frequency           Electronically signed by: Dick Caro M.D., 10/8/2023 11:37 AM